# Patient Record
Sex: FEMALE | URBAN - METROPOLITAN AREA
[De-identification: names, ages, dates, MRNs, and addresses within clinical notes are randomized per-mention and may not be internally consistent; named-entity substitution may affect disease eponyms.]

---

## 2021-05-03 ENCOUNTER — APPOINTMENT (RX ONLY)
Dept: URBAN - METROPOLITAN AREA CLINIC 151 | Facility: CLINIC | Age: 71
Setting detail: DERMATOLOGY
End: 2021-05-03

## 2021-05-03 DIAGNOSIS — B00.1 HERPESVIRAL VESICULAR DERMATITIS: ICD-10-CM | Status: INADEQUATELY CONTROLLED

## 2021-05-03 DIAGNOSIS — L57.0 ACTINIC KERATOSIS: ICD-10-CM

## 2021-05-03 DIAGNOSIS — D485 NEOPLASM OF UNCERTAIN BEHAVIOR OF SKIN: ICD-10-CM

## 2021-05-03 PROBLEM — D48.5 NEOPLASM OF UNCERTAIN BEHAVIOR OF SKIN: Status: ACTIVE | Noted: 2021-05-03

## 2021-05-03 PROBLEM — D23.9 OTHER BENIGN NEOPLASM OF SKIN, UNSPECIFIED: Status: ACTIVE | Noted: 2021-05-03

## 2021-05-03 PROCEDURE — 17003 DESTRUCT PREMALG LES 2-14: CPT

## 2021-05-03 PROCEDURE — ? COUNSELING

## 2021-05-03 PROCEDURE — ? BIOPSY BY SHAVE METHOD

## 2021-05-03 PROCEDURE — 11102 TANGNTL BX SKIN SINGLE LES: CPT

## 2021-05-03 PROCEDURE — ? DIAGNOSIS COMMENT

## 2021-05-03 PROCEDURE — ? PRESCRIPTION MEDICATION MANAGEMENT

## 2021-05-03 PROCEDURE — 17000 DESTRUCT PREMALG LESION: CPT | Mod: 59

## 2021-05-03 PROCEDURE — ? LIQUID NITROGEN

## 2021-05-03 PROCEDURE — 99204 OFFICE O/P NEW MOD 45 MIN: CPT | Mod: 25

## 2021-05-03 ASSESSMENT — LOCATION ZONE DERM
LOCATION ZONE: TRUNK
LOCATION ZONE: FACE

## 2021-05-03 ASSESSMENT — LOCATION SIMPLE DESCRIPTION DERM
LOCATION SIMPLE: LEFT LOWER BACK
LOCATION SIMPLE: RIGHT FOREHEAD
LOCATION SIMPLE: LEFT BUTTOCK
LOCATION SIMPLE: RIGHT BUTTOCK
LOCATION SIMPLE: LEFT CHEEK

## 2021-05-03 ASSESSMENT — LOCATION DETAILED DESCRIPTION DERM
LOCATION DETAILED: LEFT BUTTOCK
LOCATION DETAILED: LEFT LATERAL MALAR CHEEK
LOCATION DETAILED: RIGHT LATERAL FOREHEAD
LOCATION DETAILED: LEFT LATERAL BUCCAL CHEEK
LOCATION DETAILED: LEFT INFERIOR MEDIAL LOWER BACK
LOCATION DETAILED: RIGHT BUTTOCK

## 2021-05-03 NOTE — PROCEDURE: DIAGNOSIS COMMENT
Render Risk Assessment In Note?: no
Comment: FBSE— discussed pt to cb if she needs a rf of valtrex
Detail Level: Simple
Comment: Clear today. Pt will cb for valtrex rf.
Comment: Noticed by Dr. Merrill

## 2021-05-03 NOTE — PROCEDURE: BIOPSY BY SHAVE METHOD
Detail Level: Detailed
Depth Of Biopsy: dermis
Was A Bandage Applied: Yes
Size Of Lesion In Cm: 0
Biopsy Type: H and E
Biopsy Method: double edge Personna blade
Anesthesia Type: 1% lidocaine with epinephrine and a 1:10 solution of 8.4% sodium bicarbonate
Hemostasis: Electrocautery
Wound Care: Aquaphor
Dressing: pressure dressing
Destruction After The Procedure: No
Type Of Destruction Used: Curettage
Curettage Text: The wound bed was treated with curettage after the biopsy was performed.
Cryotherapy Text: The wound bed was treated with cryotherapy after the biopsy was performed.
Electrodesiccation Text: The wound bed was treated with electrodesiccation after the biopsy was performed.
Electrodesiccation And Curettage Text: The wound bed was treated with electrodesiccation and curettage after the biopsy was performed.
Silver Nitrate Text: The wound bed was treated with silver nitrate after the biopsy was performed.
Lab: [id number]
Lab Facility: [id number]
Consent: Written consent was obtained and risks were reviewed including but not limited to scarring, infection, bleeding, scabbing, incomplete removal, nerve damage and allergy to anesthesia.
Post-Care Instructions: The medical assistant reviewed detailed post-care instructions with the patient: leave the original dressing in place for 24 hours, clean the wound once daily allowing water and gentle soap to wash over the site, cover with Aquaphor/Vaseline, and then apply a new bandage daily or if preferred apply a thick layer of Aquaphor/Vaseline twice daily until healed. Advised not to apply Neosporin or Polysporin.\\nInstructional handout provided.
Notification Instructions: Patient will be notified of biopsy results in 1-2 weeks. Advised patient to contact the office if results have not been received in 2 weeks.
Billing Type: Third-Party Bill
Information: Selecting Yes will display possible errors in your note based on the variables you have selected. This validation is only offered as a suggestion for you. PLEASE NOTE THAT THE VALIDATION TEXT WILL BE REMOVED WHEN YOU FINALIZE YOUR NOTE. IF YOU WANT TO FAX A PRELIMINARY NOTE YOU WILL NEED TO TOGGLE THIS TO 'NO' IF YOU DO NOT WANT IT IN YOUR FAXED NOTE.

## 2021-05-03 NOTE — PROCEDURE: LIQUID NITROGEN
Consent: The patient's verbal consent was obtained including but not limited to risks of crusting, scabbing, blistering, scarring, darker or lighter pigmentary change, recurrence, incomplete removal and infection.
Render Note In Bullet Format When Appropriate: No
Detail Level: Detailed
Render Post-Care Instructions In Note?: yes
Number Of Freeze-Thaw Cycles: 1 freeze-thaw cycle
Post-Care Instructions: I reviewed post-care instructions with the patient in detail: pt should expect treated lesion(s) to form a scab for ~1 week, pt may apply Vaseline or Aquaphor to crusted or scabbing areas if desired. Advised to monitor and return to clinic for re-treatment or biopsy if lesion(s) remain unresolved in ~2 months.
Duration Of Freeze Thaw-Cycle (Seconds): 0

## 2021-05-05 ENCOUNTER — HOSPITAL ENCOUNTER (OUTPATIENT)
Dept: HOSPITAL 47 - RADMRIMAIN | Age: 71
Discharge: HOME | End: 2021-05-05
Attending: ORTHOPAEDIC SURGERY
Payer: COMMERCIAL

## 2021-05-05 DIAGNOSIS — M75.112: Primary | ICD-10-CM

## 2021-05-05 DIAGNOSIS — M75.52: ICD-10-CM

## 2021-05-28 ENCOUNTER — HOSPITAL ENCOUNTER (OUTPATIENT)
Dept: HOSPITAL 47 - OR | Age: 71
Discharge: HOME | End: 2021-05-28
Attending: ORTHOPAEDIC SURGERY
Payer: COMMERCIAL

## 2021-05-28 VITALS — RESPIRATION RATE: 17 BRPM | SYSTOLIC BLOOD PRESSURE: 125 MMHG | DIASTOLIC BLOOD PRESSURE: 76 MMHG | HEART RATE: 56 BPM

## 2021-05-28 VITALS — TEMPERATURE: 97 F

## 2021-05-28 VITALS — BODY MASS INDEX: 34.1 KG/M2

## 2021-05-28 DIAGNOSIS — Z88.8: ICD-10-CM

## 2021-05-28 DIAGNOSIS — Z90.710: ICD-10-CM

## 2021-05-28 DIAGNOSIS — M75.102: Primary | ICD-10-CM

## 2021-05-28 DIAGNOSIS — Z79.890: ICD-10-CM

## 2021-05-28 DIAGNOSIS — K21.9: ICD-10-CM

## 2021-05-28 DIAGNOSIS — Z90.49: ICD-10-CM

## 2021-05-28 DIAGNOSIS — Z79.891: ICD-10-CM

## 2021-05-28 DIAGNOSIS — Z80.9: ICD-10-CM

## 2021-05-28 DIAGNOSIS — Z82.49: ICD-10-CM

## 2021-05-28 DIAGNOSIS — M25.812: ICD-10-CM

## 2021-05-28 DIAGNOSIS — Z98.890: ICD-10-CM

## 2021-05-28 DIAGNOSIS — Z83.3: ICD-10-CM

## 2021-05-28 DIAGNOSIS — Z79.899: ICD-10-CM

## 2021-05-28 DIAGNOSIS — E03.9: ICD-10-CM

## 2021-05-28 DIAGNOSIS — M19.012: ICD-10-CM

## 2021-05-28 PROCEDURE — 29827 SHO ARTHRS SRG RT8TR CUF RPR: CPT

## 2021-05-28 PROCEDURE — 29826 SHO ARTHRS SRG DECOMPRESSION: CPT

## 2021-05-28 PROCEDURE — 86901 BLOOD TYPING SEROLOGIC RH(D): CPT

## 2021-05-28 PROCEDURE — 86850 RBC ANTIBODY SCREEN: CPT

## 2021-05-28 PROCEDURE — 29824 SHO ARTHRS SRG DSTL CLAVICLC: CPT

## 2021-05-28 PROCEDURE — 76942 ECHO GUIDE FOR BIOPSY: CPT

## 2021-05-28 PROCEDURE — 84132 ASSAY OF SERUM POTASSIUM: CPT

## 2021-05-28 PROCEDURE — 86900 BLOOD TYPING SEROLOGIC ABO: CPT

## 2021-05-28 PROCEDURE — 64415 NJX AA&/STRD BRCH PLXS IMG: CPT

## 2022-07-10 ENCOUNTER — HOSPITAL ENCOUNTER (EMERGENCY)
Dept: HOSPITAL 47 - EC | Age: 72
Discharge: HOME | End: 2022-07-10
Payer: COMMERCIAL

## 2022-07-10 VITALS
TEMPERATURE: 97.3 F | SYSTOLIC BLOOD PRESSURE: 167 MMHG | RESPIRATION RATE: 18 BRPM | HEART RATE: 82 BPM | DIASTOLIC BLOOD PRESSURE: 92 MMHG

## 2022-07-10 DIAGNOSIS — H10.9: ICD-10-CM

## 2022-07-10 DIAGNOSIS — Z79.899: ICD-10-CM

## 2022-07-10 DIAGNOSIS — J06.9: Primary | ICD-10-CM

## 2022-07-10 DIAGNOSIS — Z79.890: ICD-10-CM

## 2022-07-10 DIAGNOSIS — K21.9: ICD-10-CM

## 2022-07-10 DIAGNOSIS — Z20.822: ICD-10-CM

## 2022-07-10 DIAGNOSIS — E07.9: ICD-10-CM

## 2022-07-10 PROCEDURE — 99283 EMERGENCY DEPT VISIT LOW MDM: CPT

## 2022-07-10 PROCEDURE — 87635 SARS-COV-2 COVID-19 AMP PRB: CPT

## 2022-07-10 PROCEDURE — 71046 X-RAY EXAM CHEST 2 VIEWS: CPT

## 2023-02-02 ENCOUNTER — OFFICE VISIT (OUTPATIENT)
Dept: FAMILY MEDICINE CLINIC | Facility: CLINIC | Age: 73
End: 2023-02-02
Payer: MEDICARE

## 2023-02-02 VITALS
DIASTOLIC BLOOD PRESSURE: 80 MMHG | SYSTOLIC BLOOD PRESSURE: 124 MMHG | RESPIRATION RATE: 16 BRPM | HEIGHT: 65 IN | HEART RATE: 88 BPM | WEIGHT: 170 LBS | TEMPERATURE: 97.4 F | BODY MASS INDEX: 28.32 KG/M2 | OXYGEN SATURATION: 100 %

## 2023-02-02 DIAGNOSIS — Z12.11 ENCOUNTER FOR SCREENING COLONOSCOPY: ICD-10-CM

## 2023-02-02 DIAGNOSIS — E78.2 MIXED HYPERLIPIDEMIA: ICD-10-CM

## 2023-02-02 DIAGNOSIS — E11.65 TYPE 2 DIABETES MELLITUS WITH HYPERGLYCEMIA, WITHOUT LONG-TERM CURRENT USE OF INSULIN: ICD-10-CM

## 2023-02-02 DIAGNOSIS — Z76.89 ENCOUNTER TO ESTABLISH CARE: Primary | ICD-10-CM

## 2023-02-02 DIAGNOSIS — R21 FACIAL RASH: ICD-10-CM

## 2023-02-02 PROBLEM — E11.9 DIABETES MELLITUS: Status: ACTIVE | Noted: 2023-02-02

## 2023-02-02 PROCEDURE — 99204 OFFICE O/P NEW MOD 45 MIN: CPT | Performed by: NURSE PRACTITIONER

## 2023-02-02 RX ORDER — ASPIRIN 325 MG
325 TABLET ORAL
COMMUNITY

## 2023-02-02 RX ORDER — DIPHENOXYLATE HYDROCHLORIDE AND ATROPINE SULFATE 2.5; .025 MG/1; MG/1
TABLET ORAL DAILY
COMMUNITY

## 2023-02-02 RX ORDER — ASPIRIN/SOD BICARB/CITRIC ACID 324 MG
TABLET, EFFERVESCENT ORAL
COMMUNITY

## 2023-02-02 NOTE — PROGRESS NOTES
Patient ID: Mercedes Santos is a 72 y.o. female     Patient Care Team:  Head, ESTHER Oliveira as PCP - General (Nurse Practitioner)    Subjective     Chief Complaint   Patient presents with   • Establish Care   • Diabetes   • Mass     Forehead         History of Present Illness    Mercedes Satnos presents to [facility] today to establish care with our practice.  She moved here recently from [facility].  History of diabetes which is currently managed with metformin 500 mg daily.  Last hemoglobin A1c was checked 5 months ago which was 6.1.  Urine microalbumin was also completed at that time which was negative.  Cholesterol levels were elevated with total cholesterol of 243, LDL of 162, and triglycerides of 162.  Kidney and liver function test are stable.    History of breast cancer in 2016 of right breast. ER+.  She was followed closely by MD Brito.  Last mammogram completed September 23, 2022 and was negative.    Lump to right side of forehead.  First noticed about 6 months ago.  She remembers hitting her forehead however unsure how long ago.  No tenderness to area.  She also has an area to left temple which dermatology was managing in [place].  States she has lesion removed in past however continues to return with scaly rash area.  Needs dermatology referral.      Colonoscopy completed 2013.    Last Dexa scan completed 2020.      She denies any complaints of fever, chills, cough, chest pain, shortness of air, abdominal pain, nausea, or any other concerns.     The following portions of the patient's history were reviewed and updated as appropriate: allergies, current medications, past family history, past medical history, past social history, past surgical history and problem list.        ROS    Vitals:    02/02/23 1304   BP: 124/80   Pulse: 88   Resp: 16   Temp: 97.4 °F (36.3 °C)   SpO2: 100%       Documented weights    02/02/23 1304   Weight: 77.1 kg (170 lb)     Body mass index is  28.29 kg/m².    No results found for this or any previous visit.        Objective     Physical Exam  Vitals reviewed.   Constitutional:       General: She is not in acute distress.     Appearance: She is well-developed.   HENT:      Head: Normocephalic and atraumatic.      Right Ear: Tympanic membrane normal.      Left Ear: Tympanic membrane normal.   Eyes:      Pupils: Pupils are equal, round, and reactive to light.   Cardiovascular:      Rate and Rhythm: Normal rate and regular rhythm.      Heart sounds: Normal heart sounds. No murmur heard.  Pulmonary:      Effort: Pulmonary effort is normal.      Breath sounds: Normal breath sounds. No wheezing, rhonchi or rales.   Abdominal:      General: Bowel sounds are normal.      Palpations: Abdomen is soft.      Tenderness: There is no abdominal tenderness.   Musculoskeletal:         General: No tenderness. Normal range of motion.      Cervical back: Normal range of motion and neck supple.   Skin:     General: Skin is warm and dry.      Findings: Rash (pink scaly rash to left temple.  ) present. No erythema.      Comments: Bony prominence to right side of forehead about 2 cm.  No redness, bruising, or tenderness.     Neurological:      Mental Status: She is alert and oriented to person, place, and time.   Psychiatric:         Behavior: Behavior normal.            Assessment & Plan     Assessment/Plan     Diagnoses and all orders for this visit:    1. Encounter to establish care (Primary)    2. Type 2 diabetes mellitus with hyperglycemia, without long-term current use of insulin (HCC)  -     Ambulatory Referral to Nutrition Services  -     metFORMIN (GLUCOPHAGE) 500 MG tablet; Take 1 tablet by mouth Daily With Breakfast.  Dispense: 90 tablet; Refill: 3  -     CBC (No Diff); Future  -     Comprehensive Metabolic Panel; Future  -     Hemoglobin A1c; Future  -     Lipid Panel With / Chol / HDL Ratio; Future  -     TSH Rfx On Abnormal To Free T4; Future  -     TSH Rfx On  Abnormal To Free T4  -     Lipid Panel With / Chol / HDL Ratio  -     Hemoglobin A1c  -     Comprehensive Metabolic Panel  -     CBC (No Diff)  -     Ambulatory Referral to Ophthalmology    3. Mixed hyperlipidemia  -     Ambulatory Referral to Nutrition Services  -     CBC (No Diff); Future  -     Comprehensive Metabolic Panel; Future  -     Hemoglobin A1c; Future  -     Lipid Panel With / Chol / HDL Ratio; Future  -     TSH Rfx On Abnormal To Free T4; Future  -     TSH Rfx On Abnormal To Free T4  -     Lipid Panel With / Chol / HDL Ratio  -     Hemoglobin A1c  -     Comprehensive Metabolic Panel  -     CBC (No Diff)    4. Encounter for screening colonoscopy  -     Ambulatory Referral For Screening Colonoscopy    5. Facial rash  -     Ambulatory Referral to Dermatology      Summary:  Mercedes Santos presented to office today to establish care with our practice.  We will have her schedule fasting lab appointment in the near future.  We will notify her of results.  Blood pressure current stable 124/80.  No changes in medications at this time if all labs are stable, instructed return to office in 6 months for next recheck appointment with fasting labs and AWV.  Appropriate referrals provided.  She does have history of breast cancer and was closely followed by MD Brito.  Last mammogram completed in September.  We will discuss further management of this during next office visit.  Likely will need referral to oncology for continued surveillance    In the meantime, instructed to contact us sooner for any problems or concerns.    Follow Up:  Return in about 6 months (around 8/2/2023) for Recheck on DM, Medicare Wellness with fasting labs the week before.  .    Patient was given instructions and counseling regarding condition or for health maintenance advice.  Please see specific information pulled into the AVS if appropriate.      Patient was wearing facemask when I entered the room and throughout our encounter.  Protective equipment was worn throughout this patient encounter including a face mask.  Hand hygiene was performed before donning protective equipment and after removal when leaving the room.     Emili Iraheta, APRN  Family Medicine  [facility]  02/03/23  16:58 EST

## 2023-02-03 ENCOUNTER — PATIENT ROUNDING (BHMG ONLY) (OUTPATIENT)
Dept: FAMILY MEDICINE CLINIC | Facility: CLINIC | Age: 73
End: 2023-02-03
Payer: MEDICARE

## 2023-02-03 PROBLEM — E78.2 MIXED HYPERLIPIDEMIA: Status: ACTIVE | Noted: 2023-02-03

## 2023-02-03 PROBLEM — Z85.3 HISTORY OF BREAST CANCER: Status: ACTIVE | Noted: 2023-02-03

## 2023-02-07 DIAGNOSIS — E78.2 MIXED HYPERLIPIDEMIA: Primary | ICD-10-CM

## 2023-02-07 DIAGNOSIS — E11.65 TYPE 2 DIABETES MELLITUS WITH HYPERGLYCEMIA, WITHOUT LONG-TERM CURRENT USE OF INSULIN: ICD-10-CM

## 2023-02-07 DIAGNOSIS — R79.89 ELEVATED TSH: ICD-10-CM

## 2023-02-07 LAB
ALBUMIN SERPL-MCNC: 4.6 G/DL (ref 3.5–5.2)
ALBUMIN/GLOB SERPL: 2.2 G/DL
ALP SERPL-CCNC: 99 U/L (ref 39–117)
ALT SERPL-CCNC: 16 U/L (ref 1–33)
AST SERPL-CCNC: 16 U/L (ref 1–32)
BILIRUB SERPL-MCNC: 0.5 MG/DL (ref 0–1.2)
BUN SERPL-MCNC: 17 MG/DL (ref 8–23)
BUN/CREAT SERPL: 19.8 (ref 7–25)
CALCIUM SERPL-MCNC: 9.6 MG/DL (ref 8.6–10.5)
CHLORIDE SERPL-SCNC: 104 MMOL/L (ref 98–107)
CHOLEST SERPL-MCNC: 273 MG/DL (ref 0–200)
CHOLEST/HDLC SERPL: 4.63 {RATIO}
CO2 SERPL-SCNC: 26.7 MMOL/L (ref 22–29)
CREAT SERPL-MCNC: 0.86 MG/DL (ref 0.57–1)
EGFRCR SERPLBLD CKD-EPI 2021: 71.9 ML/MIN/1.73
ERYTHROCYTE [DISTWIDTH] IN BLOOD BY AUTOMATED COUNT: 13.3 % (ref 12.3–15.4)
GLOBULIN SER CALC-MCNC: 2.1 GM/DL
GLUCOSE SERPL-MCNC: 143 MG/DL (ref 65–99)
HBA1C MFR BLD: 6.5 % (ref 4.8–5.6)
HCT VFR BLD AUTO: 43 % (ref 34–46.6)
HDLC SERPL-MCNC: 59 MG/DL (ref 40–60)
HGB BLD-MCNC: 14.4 G/DL (ref 12–15.9)
LDLC SERPL CALC-MCNC: 191 MG/DL (ref 0–100)
MCH RBC QN AUTO: 31.1 PG (ref 26.6–33)
MCHC RBC AUTO-ENTMCNC: 33.5 G/DL (ref 31.5–35.7)
MCV RBC AUTO: 92.9 FL (ref 79–97)
PLATELET # BLD AUTO: 233 10*3/MM3 (ref 140–450)
POTASSIUM SERPL-SCNC: 4.3 MMOL/L (ref 3.5–5.2)
PROT SERPL-MCNC: 6.7 G/DL (ref 6–8.5)
RBC # BLD AUTO: 4.63 10*6/MM3 (ref 3.77–5.28)
SODIUM SERPL-SCNC: 141 MMOL/L (ref 136–145)
T4 FREE SERPL-MCNC: 1.08 NG/DL
TRIGL SERPL-MCNC: 128 MG/DL (ref 0–150)
TSH SERPL DL<=0.005 MIU/L-ACNC: 4.98 UIU/ML (ref 0.27–4.2)
VLDLC SERPL CALC-MCNC: 23 MG/DL (ref 5–40)
WBC # BLD AUTO: 4.84 10*3/MM3 (ref 3.4–10.8)

## 2023-02-22 ENCOUNTER — HOSPITAL ENCOUNTER (OUTPATIENT)
Dept: DIABETES SERVICES | Facility: HOSPITAL | Age: 73
Discharge: HOME | End: 2023-02-22
Admitting: NURSE PRACTITIONER
Payer: MEDICARE

## 2023-02-22 PROCEDURE — 97802 MEDICAL NUTRITION INDIV IN: CPT

## 2023-02-22 NOTE — CONSULTS
Mrs. Santos was seen today by Registered Dietitian for Diabetes diet education. Consistent with the ADA’s standards of care, a comprehensive assessment/training record has been sent to medical records (see media tab).    Family history of diabetes and heart disease. Moved here November 2022. Hx of diabetes education while in [place]. Blood sugar typically 115 or less in morning. Was educated on 2 carb choice breakfast, 3 carb choice lunch, 2 carb choice breakfast previously. Discussed more flexible range of 2-3 at each meal. Reviewed labels, counting carbs, meal planning. Likes to walk, does stretches. Hasn't been as active since its winter, encouraged chair exercises for cold weather/rainy day plan.     Mrs. Santos has been encouraged to call our office with questions or additional education needs. Please place referral for additional services or follow-up should need arise.    Thank you for the referral.

## 2023-04-08 ENCOUNTER — HOSPITAL ENCOUNTER (OUTPATIENT)
Dept: HOSPITAL 47 - EC | Age: 73
Setting detail: OBSERVATION
LOS: 2 days | Discharge: HOME | End: 2023-04-10
Attending: INTERNAL MEDICINE | Admitting: INTERNAL MEDICINE
Payer: MEDICARE

## 2023-04-08 DIAGNOSIS — Z79.890: ICD-10-CM

## 2023-04-08 DIAGNOSIS — Z98.84: ICD-10-CM

## 2023-04-08 DIAGNOSIS — E87.20: ICD-10-CM

## 2023-04-08 DIAGNOSIS — I44.0: ICD-10-CM

## 2023-04-08 DIAGNOSIS — E87.6: ICD-10-CM

## 2023-04-08 DIAGNOSIS — K21.9: ICD-10-CM

## 2023-04-08 DIAGNOSIS — K11.8: ICD-10-CM

## 2023-04-08 DIAGNOSIS — Z90.710: ICD-10-CM

## 2023-04-08 DIAGNOSIS — R42: Primary | ICD-10-CM

## 2023-04-08 DIAGNOSIS — E03.9: ICD-10-CM

## 2023-04-08 DIAGNOSIS — Z88.8: ICD-10-CM

## 2023-04-08 DIAGNOSIS — I49.9: ICD-10-CM

## 2023-04-08 DIAGNOSIS — Z82.49: ICD-10-CM

## 2023-04-08 DIAGNOSIS — Z79.899: ICD-10-CM

## 2023-04-08 DIAGNOSIS — Z98.890: ICD-10-CM

## 2023-04-08 DIAGNOSIS — E66.9: ICD-10-CM

## 2023-04-08 LAB
ALBUMIN SERPL-MCNC: 2.4 G/DL (ref 3.5–5)
ALP SERPL-CCNC: 35 U/L (ref 38–126)
ALT SERPL-CCNC: 18 U/L (ref 4–34)
ANION GAP SERPL CALC-SCNC: 3 MMOL/L
AST SERPL-CCNC: 22 U/L (ref 14–36)
BASOPHILS # BLD AUTO: 0 K/UL (ref 0–0.2)
BASOPHILS NFR BLD AUTO: 1 %
BUN SERPL-SCNC: 14 MG/DL (ref 7–17)
CALCIUM SPEC-MCNC: 6.6 MG/DL (ref 8.4–10.2)
CHLORIDE SERPL-SCNC: 117 MMOL/L (ref 98–107)
CO2 SERPL-SCNC: 21 MMOL/L (ref 22–30)
EOSINOPHIL # BLD AUTO: 0.2 K/UL (ref 0–0.7)
EOSINOPHIL NFR BLD AUTO: 3 %
ERYTHROCYTE [DISTWIDTH] IN BLOOD BY AUTOMATED COUNT: 4.45 M/UL (ref 3.8–5.4)
ERYTHROCYTE [DISTWIDTH] IN BLOOD: 12.4 % (ref 11.5–15.5)
GLUCOSE SERPL-MCNC: 74 MG/DL (ref 74–99)
HCT VFR BLD AUTO: 41.6 % (ref 34–46)
HGB BLD-MCNC: 13.9 GM/DL (ref 11.4–16)
INR PPP: 0.9 (ref ?–1.2)
LYMPHOCYTES # SPEC AUTO: 2.3 K/UL (ref 1–4.8)
LYMPHOCYTES NFR SPEC AUTO: 38 %
MCH RBC QN AUTO: 31.1 PG (ref 25–35)
MCHC RBC AUTO-ENTMCNC: 33.3 G/DL (ref 31–37)
MCV RBC AUTO: 93.5 FL (ref 80–100)
MONOCYTES # BLD AUTO: 0.3 K/UL (ref 0–1)
MONOCYTES NFR BLD AUTO: 6 %
NEUTROPHILS # BLD AUTO: 3 K/UL (ref 1.3–7.7)
NEUTROPHILS NFR BLD AUTO: 50 %
PLATELET # BLD AUTO: 179 K/UL (ref 150–450)
POTASSIUM SERPL-SCNC: 3.3 MMOL/L (ref 3.5–5.1)
PROT SERPL-MCNC: 4.6 G/DL (ref 6.3–8.2)
PT BLD: 10.1 SEC (ref 9–12)
SODIUM SERPL-SCNC: 141 MMOL/L (ref 137–145)
WBC # BLD AUTO: 6 K/UL (ref 3.8–10.6)

## 2023-04-08 PROCEDURE — 85610 PROTHROMBIN TIME: CPT

## 2023-04-08 PROCEDURE — 99285 EMERGENCY DEPT VISIT HI MDM: CPT

## 2023-04-08 PROCEDURE — 96360 HYDRATION IV INFUSION INIT: CPT

## 2023-04-08 PROCEDURE — 80053 COMPREHEN METABOLIC PANEL: CPT

## 2023-04-08 PROCEDURE — 96361 HYDRATE IV INFUSION ADD-ON: CPT

## 2023-04-08 PROCEDURE — 70551 MRI BRAIN STEM W/O DYE: CPT

## 2023-04-08 PROCEDURE — 82330 ASSAY OF CALCIUM: CPT

## 2023-04-08 PROCEDURE — 85025 COMPLETE CBC W/AUTO DIFF WBC: CPT

## 2023-04-08 PROCEDURE — 70549 MR ANGIOGRAPH NECK W/O&W/DYE: CPT

## 2023-04-08 PROCEDURE — 76536 US EXAM OF HEAD AND NECK: CPT

## 2023-04-08 PROCEDURE — 80048 BASIC METABOLIC PNL TOTAL CA: CPT

## 2023-04-08 PROCEDURE — 84443 ASSAY THYROID STIM HORMONE: CPT

## 2023-04-08 PROCEDURE — 70496 CT ANGIOGRAPHY HEAD: CPT

## 2023-04-08 PROCEDURE — 80061 LIPID PANEL: CPT

## 2023-04-08 PROCEDURE — 93306 TTE W/DOPPLER COMPLETE: CPT

## 2023-04-08 PROCEDURE — 96372 THER/PROPH/DIAG INJ SC/IM: CPT

## 2023-04-08 PROCEDURE — 70498 CT ANGIOGRAPHY NECK: CPT

## 2023-04-08 PROCEDURE — 36415 COLL VENOUS BLD VENIPUNCTURE: CPT

## 2023-04-08 PROCEDURE — 70544 MR ANGIOGRAPHY HEAD W/O DYE: CPT

## 2023-04-08 PROCEDURE — 93005 ELECTROCARDIOGRAM TRACING: CPT

## 2023-04-08 PROCEDURE — 84484 ASSAY OF TROPONIN QUANT: CPT

## 2023-04-08 PROCEDURE — 83036 HEMOGLOBIN GLYCOSYLATED A1C: CPT

## 2023-04-08 PROCEDURE — 70450 CT HEAD/BRAIN W/O DYE: CPT

## 2023-04-08 RX ADMIN — CEFAZOLIN SCH MLS/HR: 330 INJECTION, POWDER, FOR SOLUTION INTRAMUSCULAR; INTRAVENOUS at 23:15

## 2023-04-09 LAB
ANION GAP SERPL CALC-SCNC: 5 MMOL/L
BASOPHILS # BLD AUTO: 0 K/UL (ref 0–0.2)
BASOPHILS NFR BLD AUTO: 0 %
BUN SERPL-SCNC: 13 MG/DL (ref 7–17)
CALCIUM SPEC-MCNC: 9 MG/DL (ref 8.4–10.2)
CHLORIDE SERPL-SCNC: 112 MMOL/L (ref 98–107)
CHOLEST SERPL-MCNC: 154 MG/DL (ref 0–200)
CO2 SERPL-SCNC: 23 MMOL/L (ref 22–30)
EOSINOPHIL # BLD AUTO: 0.2 K/UL (ref 0–0.7)
EOSINOPHIL NFR BLD AUTO: 5 %
ERYTHROCYTE [DISTWIDTH] IN BLOOD BY AUTOMATED COUNT: 4.01 M/UL (ref 3.8–5.4)
ERYTHROCYTE [DISTWIDTH] IN BLOOD: 12.3 % (ref 11.5–15.5)
GLUCOSE SERPL-MCNC: 110 MG/DL (ref 74–99)
HCT VFR BLD AUTO: 37.4 % (ref 34–46)
HDLC SERPL-MCNC: 36.6 MG/DL (ref 40–60)
HGB BLD-MCNC: 12.6 GM/DL (ref 11.4–16)
LDLC SERPL CALC-MCNC: 98.5 MG/DL (ref 0–131)
LYMPHOCYTES # SPEC AUTO: 1.7 K/UL (ref 1–4.8)
LYMPHOCYTES NFR SPEC AUTO: 37 %
MCH RBC QN AUTO: 31.5 PG (ref 25–35)
MCHC RBC AUTO-ENTMCNC: 33.7 G/DL (ref 31–37)
MCV RBC AUTO: 93.4 FL (ref 80–100)
MONOCYTES # BLD AUTO: 0.3 K/UL (ref 0–1)
MONOCYTES NFR BLD AUTO: 6 %
NEUTROPHILS # BLD AUTO: 2.3 K/UL (ref 1.3–7.7)
NEUTROPHILS NFR BLD AUTO: 50 %
PLATELET # BLD AUTO: 165 K/UL (ref 150–450)
POTASSIUM SERPL-SCNC: 3.8 MMOL/L (ref 3.5–5.1)
SODIUM SERPL-SCNC: 140 MMOL/L (ref 137–145)
TRIGL SERPL-MCNC: 94.6 MG/DL (ref 0–149)
VLDLC SERPL CALC-MCNC: 18.92 MG/DL (ref 5–40)
WBC # BLD AUTO: 4.6 K/UL (ref 3.8–10.6)

## 2023-04-09 RX ADMIN — CEFAZOLIN SCH: 330 INJECTION, POWDER, FOR SOLUTION INTRAMUSCULAR; INTRAVENOUS at 11:38

## 2023-04-09 RX ADMIN — HEPARIN SODIUM SCH: 5000 INJECTION INTRAVENOUS; SUBCUTANEOUS at 17:38

## 2023-04-09 RX ADMIN — HEPARIN SODIUM SCH UNIT: 5000 INJECTION INTRAVENOUS; SUBCUTANEOUS at 08:00

## 2023-04-09 RX ADMIN — HEPARIN SODIUM SCH: 5000 INJECTION INTRAVENOUS; SUBCUTANEOUS at 23:06

## 2023-04-09 RX ADMIN — CEFAZOLIN SCH: 330 INJECTION, POWDER, FOR SOLUTION INTRAMUSCULAR; INTRAVENOUS at 13:55

## 2023-04-10 VITALS — RESPIRATION RATE: 16 BRPM | TEMPERATURE: 97.4 F

## 2023-04-10 VITALS — HEART RATE: 67 BPM | SYSTOLIC BLOOD PRESSURE: 149 MMHG | DIASTOLIC BLOOD PRESSURE: 84 MMHG

## 2023-04-10 RX ADMIN — HEPARIN SODIUM SCH UNIT: 5000 INJECTION INTRAVENOUS; SUBCUTANEOUS at 08:41

## 2023-04-10 RX ADMIN — HEPARIN SODIUM SCH: 5000 INJECTION INTRAVENOUS; SUBCUTANEOUS at 17:03

## 2023-07-24 ENCOUNTER — OFFICE VISIT (OUTPATIENT)
Dept: FAMILY MEDICINE CLINIC | Facility: CLINIC | Age: 73
End: 2023-07-24
Payer: MEDICARE

## 2023-07-24 VITALS
HEIGHT: 65 IN | DIASTOLIC BLOOD PRESSURE: 78 MMHG | TEMPERATURE: 97.1 F | WEIGHT: 175 LBS | BODY MASS INDEX: 29.16 KG/M2 | SYSTOLIC BLOOD PRESSURE: 118 MMHG | OXYGEN SATURATION: 99 % | HEART RATE: 83 BPM

## 2023-07-24 DIAGNOSIS — K59.00 CONSTIPATION, UNSPECIFIED CONSTIPATION TYPE: ICD-10-CM

## 2023-07-24 DIAGNOSIS — Z12.11 COLON CANCER SCREENING: ICD-10-CM

## 2023-07-24 DIAGNOSIS — Z85.3 HISTORY OF BREAST CANCER: ICD-10-CM

## 2023-07-24 DIAGNOSIS — N64.4 BREAST PAIN, LEFT: ICD-10-CM

## 2023-07-24 DIAGNOSIS — M79.629 PAIN IN AXILLA, UNSPECIFIED LATERALITY: Primary | ICD-10-CM

## 2023-07-24 PROCEDURE — 3044F HG A1C LEVEL LT 7.0%: CPT | Performed by: NURSE PRACTITIONER

## 2023-07-24 PROCEDURE — 93000 ELECTROCARDIOGRAM COMPLETE: CPT | Performed by: NURSE PRACTITIONER

## 2023-07-24 PROCEDURE — 1159F MED LIST DOCD IN RCRD: CPT | Performed by: NURSE PRACTITIONER

## 2023-07-24 PROCEDURE — 99214 OFFICE O/P EST MOD 30 MIN: CPT | Performed by: NURSE PRACTITIONER

## 2023-07-24 PROCEDURE — 1160F RVW MEDS BY RX/DR IN RCRD: CPT | Performed by: NURSE PRACTITIONER

## 2023-07-24 NOTE — PROGRESS NOTES
Answers submitted by the patient for this visit:  Other (Submitted on 7/24/2023)  Please describe your symptoms.: Inflamatory pain under arm and back by spine and shoulder blade.  Acute constipation. Pain on right side.  Have you had these symptoms before?: Yes  How long have you been having these symptoms?: Greater than 2 weeks  Please list any medications you are currently taking for this condition.: Metformin  Please describe any probable cause for these symptoms. : Unknown  Primary Reason for Visit (Submitted on 7/24/2023)  What is the primary reason for your visit?: Other  Patient ID: Mercedes Santos is a 73 y.o. female     Patient Care Team:  Head, ESTHER Oliveira as PCP - General (Nurse Practitioner)    Subjective     Chief Complaint   Patient presents with    Pain     Under left arm, at times will be sharp shooting pain    Constipation       History of Present Illness    Mercedes Santos presents to [facility] today for complaints of left arm/axilla pain with radiation to left scapula.   Upper back pain and under left arm, lateral left breast and left axilla pain. Onset couple of months ago.  Pain will be dull at times and then sharp.  Fell Mother's day weekend and injured left forearm. Unsure if related.  Pain feels like related to inflammation. At one point, felt what she though was enlarged lymph nodes to left axilla which were tender.  She increased her fluid intake which she feels helped with this.  Home remedies:  None.   Hx of right breast cancer in 2016.      Complains of right side of abdomen pain.  Onset 1 year ago.   Intermittent.  Problems with constipation.  Last BM yesterday however had to use enema and had increased gas.  Complains of abdomen bloating.   No blood in stool.    Has tried probiotic and Miralax. Has not been taking Miralax due to worried about affecting kidney function.    Will have nausea at times.  No vomiting.  No weight loss.   Has been 10 years  since colonoscopy.     She denies any complaints of fever, chills, cough, chest pain, shortness of air, nausea, or any other concerns.     The following portions of the patient's history were reviewed and updated as appropriate: allergies, current medications, past family history, past medical history, past social history, past surgical history and problem list.       ROS    Vitals:    07/24/23 0813   BP: 118/78   Pulse: 83   Temp: 97.1 °F (36.2 °C)   SpO2: 99%       Documented weights    07/24/23 0813   Weight: 79.4 kg (175 lb)     Body mass index is 29.12 kg/m².    Results for orders placed or performed in visit on 02/02/23   TSH Rfx On Abnormal To Free T4    Specimen: Blood   Result Value Ref Range    TSH 4.98 (H) 0.27 - 4.20 uIU/mL   Lipid Panel With / Chol / HDL Ratio    Specimen: Blood   Result Value Ref Range    Total Cholesterol 273 (H) 0 - 200 mg/dL    Triglycerides 128 0 - 150 mg/dL    HDL Cholesterol 59 40 - 60 mg/dL    VLDL Cholesterol Federico 23 5 - 40 mg/dL    LDL Chol Calc (NIH) 191 (H) 0 - 100 mg/dL    Chol/HDL Ratio 4.63    Hemoglobin A1c    Specimen: Blood   Result Value Ref Range    Hemoglobin A1C 6.50 (H) 4.80 - 5.60 %   Comprehensive Metabolic Panel    Specimen: Blood   Result Value Ref Range    Glucose 143 (H) 65 - 99 mg/dL    BUN 17 8 - 23 mg/dL    Creatinine 0.86 0.57 - 1.00 mg/dL    EGFR Result 71.9 >60.0 mL/min/1.73    BUN/Creatinine Ratio 19.8 7.0 - 25.0    Sodium 141 136 - 145 mmol/L    Potassium 4.3 3.5 - 5.2 mmol/L    Chloride 104 98 - 107 mmol/L    Total CO2 26.7 22.0 - 29.0 mmol/L    Calcium 9.6 8.6 - 10.5 mg/dL    Total Protein 6.7 6.0 - 8.5 g/dL    Albumin 4.6 3.5 - 5.2 g/dL    Globulin 2.1 gm/dL    A/G Ratio 2.2 g/dL    Total Bilirubin 0.5 0.0 - 1.2 mg/dL    Alkaline Phosphatase 99 39 - 117 U/L    AST (SGOT) 16 1 - 32 U/L    ALT (SGPT) 16 1 - 33 U/L   CBC (No Diff)    Specimen: Blood   Result Value Ref Range    WBC 4.84 3.40 - 10.80 10*3/mm3    RBC 4.63 3.77 - 5.28 10*6/mm3     Hemoglobin 14.4 12.0 - 15.9 g/dL    Hematocrit 43.0 34.0 - 46.6 %    MCV 92.9 79.0 - 97.0 fL    MCH 31.1 26.6 - 33.0 pg    MCHC 33.5 31.5 - 35.7 g/dL    RDW 13.3 12.3 - 15.4 %    Platelets 233 140 - 450 10*3/mm3   T4, Free   Result Value Ref Range    Free T4 1.08 ng/dL           Objective     Physical Exam  Vitals reviewed.   Constitutional:       General: She is not in acute distress.  HENT:      Head: Normocephalic and atraumatic.   Cardiovascular:      Rate and Rhythm: Normal rate and regular rhythm.      Heart sounds: No murmur heard.  Pulmonary:      Effort: Pulmonary effort is normal.      Breath sounds: Normal breath sounds. No wheezing.   Chest:      Chest wall: No mass.   Breasts:     Right: Normal. No inverted nipple, mass or skin change.      Left: Normal. No inverted nipple, mass or skin change.      Comments: Tenderness to bilateral axilla area.  Right > left.  No lymphadenopathy.    Abdominal:      General: Bowel sounds are normal. There is no distension.      Palpations: Abdomen is soft. There is no hepatomegaly or splenomegaly.      Tenderness: generalized       Hernia: No hernia is present.   Musculoskeletal:      Right shoulder: Normal. No swelling, deformity or tenderness.      Left shoulder: Normal. No swelling, deformity or tenderness.      Cervical back: Normal.      Thoracic back: Normal.   Skin:     General: Skin is warm and dry.   Neurological:      Mental Status: She is alert.       ECG 12 Lead    Date/Time: 7/24/2023 10:08 AM  Performed by: Emili Iraheta APRN  Authorized by: Emili Iraheta APRN   Previous ECG: no previous ECG available  Rhythm: sinus rhythm  Rate: normal  BPM: 84  Conduction: conduction normal  ST Segments: ST segments normal  T Waves: T waves normal  QRS axis: normal  Other findings: low voltage  Clinical impression comment: borderline EKG           Assessment & Plan     Assessment/Plan     Diagnoses and all orders for this visit:    1. Pain in axilla, unspecified laterality  (Primary)  -     Mammo diagnostic digital tomosynthesis bilateral w CAD; Future  -     US breast bilateral limited; Future    2. Colon cancer screening  -     Ambulatory Referral For Screening Colonoscopy    3. History of breast cancer  -     Mammo diagnostic digital tomosynthesis bilateral w CAD; Future  -     US breast bilateral limited; Future    4. Breast pain, left  -     Mammo diagnostic digital tomosynthesis bilateral w CAD; Future  -     US breast bilateral limited; Future    5. Constipation, unspecified constipation type    Other orders  -     ECG 12 Lead          Summary:  Mercedes Santos has had problems with left arm, left axilla, and left lateral breast pain for the past couple of months.  Pain will also occur at times to left scapula and upper back.  No home remedies.  EKG completed in office today stable.  She has history of right breast cancer.  Due to current symptoms, recommend proceeding with diagnostic mammogram for further evaluation.  Results will determine further recommendations.  May also try heating pad to see if helps along with Tylenol or ibuprofen.  She is due for screening colonoscopy.  Referral was placed back in February however she states she was not contacted regarding this.  Placed new referral.  Concerning the upper abdominal pain and constipation, recommend taking MiraLAX daily to see if helps.  Increasing fluids and fiber.  We will reevaluate during next office visit.  Sooner for any problems.    In the meantime, instructed to contact us sooner for any problems or concerns.    Follow Up:  Return for Next scheduled follow up.    Patient was given instructions and counseling regarding condition or for health maintenance advice.  Please see specific information pulled into the AVS if appropriate.          Emili Iraheta, APRN  Family Medicine  [facility] [place] [facility]  07/24/23  10:13 EDT

## 2023-07-27 ENCOUNTER — OFFICE VISIT (OUTPATIENT)
Dept: FAMILY MEDICINE CLINIC | Facility: CLINIC | Age: 73
End: 2023-07-27
Payer: MEDICARE

## 2023-07-27 ENCOUNTER — HOSPITAL ENCOUNTER (OUTPATIENT)
Dept: GENERAL RADIOLOGY | Facility: HOSPITAL | Age: 73
Discharge: HOME | End: 2023-07-27
Admitting: NURSE PRACTITIONER
Payer: MEDICARE

## 2023-07-27 VITALS
SYSTOLIC BLOOD PRESSURE: 140 MMHG | HEART RATE: 94 BPM | TEMPERATURE: 98 F | WEIGHT: 173 LBS | BODY MASS INDEX: 28.82 KG/M2 | OXYGEN SATURATION: 97 % | HEIGHT: 65 IN | DIASTOLIC BLOOD PRESSURE: 80 MMHG

## 2023-07-27 DIAGNOSIS — K59.00 CONSTIPATION, UNSPECIFIED CONSTIPATION TYPE: Primary | ICD-10-CM

## 2023-07-27 DIAGNOSIS — R14.0 ABDOMINAL BLOATING: ICD-10-CM

## 2023-07-27 PROCEDURE — 99213 OFFICE O/P EST LOW 20 MIN: CPT | Performed by: NURSE PRACTITIONER

## 2023-07-27 PROCEDURE — 74018 RADEX ABDOMEN 1 VIEW: CPT

## 2023-07-27 PROCEDURE — 3044F HG A1C LEVEL LT 7.0%: CPT | Performed by: NURSE PRACTITIONER

## 2023-07-27 NOTE — PROGRESS NOTES
Patient ID: Mercedes Santos is a 73 y.o. female     Patient Care Team:  Head, ESTHER Oliveira as PCP - General (Nurse Practitioner)    Subjective     Chief Complaint   Patient presents with    Constipation       Mercedes Santos presents to [facility] today for constipation.     Constipation  This is a recurrent problem. The current episode started 1 to 4 weeks ago. The problem has been gradually worsening since onset. Her stool frequency is 1 time per day. The stool is described as pencil thin. There has Been adequate water intake. Associated symptoms include bloating, flatus and nausea. Pertinent negatives include no fever, hematochezia or melena. She has tried laxatives and enemas for the symptoms. The treatment provided mild relief.   She was seen a few days ago and discussed treatment options for constipation.  Had advised to take MiraLAX daily.  She has taken as directed without improvement of symptoms.  Symptoms are now starting affect her appetite.      She denies any complaints of fever, chills, cough, chest pain, shortness of air, nausea, or any other concerns.     The following portions of the patient's history were reviewed and updated as appropriate: allergies, current medications, past family history, past medical history, past social history, past surgical history and problem list.       Review of Systems   Constitutional: Negative for fever.   Gastrointestinal:  Positive for bloating, constipation, flatus and nausea. Negative for hematochezia and melena.     Vitals:    07/27/23 1255   BP: 140/80   Pulse: 94   Temp: 98 °F (36.7 °C)   SpO2: 97%       Documented weights    07/27/23 1255   Weight: 78.5 kg (173 lb)     Body mass index is 28.79 kg/m².    Results for orders placed or performed in visit on 02/02/23   TSH Rfx On Abnormal To Free T4    Specimen: Blood   Result Value Ref Range    TSH 4.98 (H) 0.27 - 4.20 uIU/mL   Lipid Panel With / Chol / HDL Ratio    Specimen: Blood    Result Value Ref Range    Total Cholesterol 273 (H) 0 - 200 mg/dL    Triglycerides 128 0 - 150 mg/dL    HDL Cholesterol 59 40 - 60 mg/dL    VLDL Cholesterol Federico 23 5 - 40 mg/dL    LDL Chol Calc (NIH) 191 (H) 0 - 100 mg/dL    Chol/HDL Ratio 4.63    Hemoglobin A1c    Specimen: Blood   Result Value Ref Range    Hemoglobin A1C 6.50 (H) 4.80 - 5.60 %   Comprehensive Metabolic Panel    Specimen: Blood   Result Value Ref Range    Glucose 143 (H) 65 - 99 mg/dL    BUN 17 8 - 23 mg/dL    Creatinine 0.86 0.57 - 1.00 mg/dL    EGFR Result 71.9 >60.0 mL/min/1.73    BUN/Creatinine Ratio 19.8 7.0 - 25.0    Sodium 141 136 - 145 mmol/L    Potassium 4.3 3.5 - 5.2 mmol/L    Chloride 104 98 - 107 mmol/L    Total CO2 26.7 22.0 - 29.0 mmol/L    Calcium 9.6 8.6 - 10.5 mg/dL    Total Protein 6.7 6.0 - 8.5 g/dL    Albumin 4.6 3.5 - 5.2 g/dL    Globulin 2.1 gm/dL    A/G Ratio 2.2 g/dL    Total Bilirubin 0.5 0.0 - 1.2 mg/dL    Alkaline Phosphatase 99 39 - 117 U/L    AST (SGOT) 16 1 - 32 U/L    ALT (SGPT) 16 1 - 33 U/L   CBC (No Diff)    Specimen: Blood   Result Value Ref Range    WBC 4.84 3.40 - 10.80 10*3/mm3    RBC 4.63 3.77 - 5.28 10*6/mm3    Hemoglobin 14.4 12.0 - 15.9 g/dL    Hematocrit 43.0 34.0 - 46.6 %    MCV 92.9 79.0 - 97.0 fL    MCH 31.1 26.6 - 33.0 pg    MCHC 33.5 31.5 - 35.7 g/dL    RDW 13.3 12.3 - 15.4 %    Platelets 233 140 - 450 10*3/mm3   T4, Free   Result Value Ref Range    Free T4 1.08 ng/dL           Objective     Physical Exam  Vitals reviewed.   Constitutional:       General: She is not in acute distress.  Cardiovascular:      Rate and Rhythm: Normal rate.   Pulmonary:      Effort: Pulmonary effort is normal.   Abdominal:      General: Bowel sounds are decreased.      Palpations: Abdomen is soft. There is no hepatomegaly or splenomegaly.      Comments: Lower abdomen tenderness   Neurological:      Mental Status: She is alert.          Assessment & Plan     Assessment/Plan     Diagnoses and all orders for this  visit:    1. Constipation, unspecified constipation type (Primary)  -     XR Abdomen KUB    2. Abdominal bloating  -     XR Abdomen KUB      Xray today to determine further recommendations.    Continue to increase fluids and fiber.        In the meantime, instructed to contact us sooner for any problems or concerns.    Follow Up:  Return if symptoms worsen or fail to improve, for Next scheduled follow up.    Patient was given instructions and counseling regarding condition or for health maintenance advice.  Please see specific information pulled into the AVS if appropriate.          Emili Iraheta, APRN  Family Medicine  [facility] [place]  07/27/23  14:19 EDT

## 2023-07-28 ENCOUNTER — PATIENT ROUNDING (BHMG ONLY) (OUTPATIENT)
Dept: FAMILY MEDICINE CLINIC | Facility: CLINIC | Age: 73
End: 2023-07-28
Payer: MEDICARE

## 2023-08-28 ENCOUNTER — OFFICE VISIT (OUTPATIENT)
Dept: FAMILY MEDICINE CLINIC | Facility: CLINIC | Age: 73
End: 2023-08-28
Payer: MEDICARE

## 2023-08-28 VITALS
SYSTOLIC BLOOD PRESSURE: 120 MMHG | HEIGHT: 65 IN | OXYGEN SATURATION: 96 % | HEART RATE: 93 BPM | TEMPERATURE: 97.1 F | BODY MASS INDEX: 28.99 KG/M2 | WEIGHT: 174 LBS | DIASTOLIC BLOOD PRESSURE: 72 MMHG

## 2023-08-28 DIAGNOSIS — E78.2 MIXED HYPERLIPIDEMIA: ICD-10-CM

## 2023-08-28 DIAGNOSIS — K59.00 CONSTIPATION, UNSPECIFIED CONSTIPATION TYPE: ICD-10-CM

## 2023-08-28 DIAGNOSIS — E11.65 TYPE 2 DIABETES MELLITUS WITH HYPERGLYCEMIA, WITHOUT LONG-TERM CURRENT USE OF INSULIN: ICD-10-CM

## 2023-08-28 DIAGNOSIS — R79.89 ELEVATED TSH: ICD-10-CM

## 2023-08-28 DIAGNOSIS — T78.40XA ALLERGY, INITIAL ENCOUNTER: ICD-10-CM

## 2023-08-28 DIAGNOSIS — E13.65 OTHER SPECIFIED DIABETES MELLITUS WITH HYPERGLYCEMIA, WITHOUT LONG-TERM CURRENT USE OF INSULIN: ICD-10-CM

## 2023-08-28 DIAGNOSIS — Z00.00 MEDICARE ANNUAL WELLNESS VISIT, SUBSEQUENT: Primary | ICD-10-CM

## 2023-08-28 PROCEDURE — 99214 OFFICE O/P EST MOD 30 MIN: CPT | Performed by: NURSE PRACTITIONER

## 2023-08-28 PROCEDURE — G0439 PPPS, SUBSEQ VISIT: HCPCS | Performed by: NURSE PRACTITIONER

## 2023-08-28 PROCEDURE — 1159F MED LIST DOCD IN RCRD: CPT | Performed by: NURSE PRACTITIONER

## 2023-08-28 PROCEDURE — 1160F RVW MEDS BY RX/DR IN RCRD: CPT | Performed by: NURSE PRACTITIONER

## 2023-08-28 PROCEDURE — 3044F HG A1C LEVEL LT 7.0%: CPT | Performed by: NURSE PRACTITIONER

## 2023-08-28 PROCEDURE — 1170F FXNL STATUS ASSESSED: CPT | Performed by: NURSE PRACTITIONER

## 2023-08-28 RX ORDER — OLOPATADINE HYDROCHLORIDE 2 MG/ML
1 SOLUTION/ DROPS OPHTHALMIC DAILY
Qty: 2.5 ML | Refills: 2 | Status: SHIPPED | OUTPATIENT
Start: 2023-08-28

## 2023-08-28 NOTE — PROGRESS NOTES
The ABCs of the Annual Wellness Visit  Subsequent Medicare Wellness Visit    Subjective    Mercedes Santos is a 73 y.o. female who presents for a Subsequent Medicare Wellness Visit.    The following portions of the patient's history were reviewed and   updated as appropriate: allergies, current medications, past family history, past medical history, past social history, past surgical history, and problem list.    Compared to one year ago, the patient feels her physical   health is worse. Worsening GI issues of constipation.      Compared to one year ago, the patient feels her mental   health is worse. Mild depression however does not feel she needs medication.      Recent Hospitalizations:  She was not admitted to the hospital during the last year.       Current Medical Providers:  Patient Care Team:  Head, ESTHER Oliveira as PCP - General (Nurse Practitioner)    Outpatient Medications Prior to Visit   Medication Sig Dispense Refill    aspirin-sod bicarb-citric acid ([place]) 325 MG effervescent tablet       metFORMIN (GLUCOPHAGE) 500 MG tablet Take 1 tablet by mouth Daily With Breakfast. 90 tablet 3    multivitamin (THERAGRAN) tablet tablet Take  by mouth Daily.      aspirin 325 MG tablet Take 1 tablet by mouth.       No facility-administered medications prior to visit.       No opioid medication identified on active medication list. I have reviewed chart for other potential  high risk medication/s and harmful drug interactions in the elderly.        Aspirin is on active medication list. Aspirin use is indicated based on review of current medical condition/s. Pros and cons of this therapy have been discussed today. Benefits of this medication outweigh potential harm.  Patient has been encouraged to continue taking this medication.  .      Patient Active Problem List   Diagnosis    Diabetes mellitus    History of breast cancer    Mixed hyperlipidemia     Advance Care Planning   Advance Care Planning     Advance  "Directive is not on file.  ACP discussion was held with the patient during this visit. Patient does not have an advance directive, declines further assistance.     Objective    Vitals:    23 1313   BP: 120/72   Pulse: 93   Temp: 97.1 øF (36.2 øC)   SpO2: 96%   Weight: 78.9 kg (174 lb)   Height: 165.1 cm (65\")     Estimated body mass index is 28.96 kg/mý as calculated from the following:    Height as of this encounter: 165.1 cm (65\").    Weight as of this encounter: 78.9 kg (174 lb).    BMI is >= 25 and <30. (Overweight) The following options were offered after discussion;: exercise counseling/recommendations and nutrition counseling/recommendations      Does the patient have evidence of cognitive impairment? No    Lab Results   Component Value Date    CHLPL 310 (H) 2023    TRIG 146 2023    HDL 56 2023     (H) 2023    VLDL 27 2023    HGBA1C 6.60 (H) 2023        HEALTH RISK ASSESSMENT    Smoking Status:  Social History     Tobacco Use   Smoking Status Former    Packs/day: 0.25    Years: 3.00    Pack years: 0.75    Types: Cigarettes    Quit date: 1969    Years since quittin.6   Smokeless Tobacco Never     Alcohol Consumption:  Social History     Substance and Sexual Activity   Alcohol Use None     Fall Risk Screen:    STEADI Fall Risk Assessment was completed, and patient is at HIGH risk for falls. Assessment completed on:2023   Fell from  sidewalk on mother's Day.     Depression Screenin/28/2023     1:18 PM   PHQ-2/PHQ-9 Depression Screening   Little Interest or Pleasure in Doing Things 0-->not at all   Feeling Down, Depressed or Hopeless 0-->not at all   PHQ-9: Brief Depression Severity Measure Score 0       Health Habits and Functional and Cognitive Screenin/28/2023     1:17 PM   Functional & Cognitive Status   Do you have difficulty preparing food and eating? No   Do you have difficulty bathing yourself, getting dressed or grooming " yourself? No   Do you have difficulty using the toilet? No   Do you have difficulty moving around from place to place? No   Do you have trouble with steps or getting out of a bed or a chair? No   Current Diet Well Balanced Diet   Dental Exam Up to date   Eye Exam Up to date   Exercise (times per week) 7 times per week   Current Exercises Include Walking   Do you need help using the phone?  No   Are you deaf or do you have serious difficulty hearing?  No   Do you need help to go to places out of walking distance? No   Do you need help shopping? No   Do you need help preparing meals?  No   Do you need help with housework?  No   Do you need help with laundry? No   Do you need help taking your medications? No   Do you need help managing money? No   Do you ever drive or ride in a car without wearing a seat belt? No       Age-appropriate Screening Schedule:  Refer to the list below for future screening recommendations based on patient's age, sex and/or medical conditions. Orders for these recommended tests are listed in the plan section. The patient has been provided with a written plan.    Health Maintenance   Topic Date Due    COLORECTAL CANCER SCREENING  Never done    HEPATITIS C SCREENING  08/28/2023 (Originally 2/2/2023)    DXA SCAN  08/28/2023 (Originally 1950)    ZOSTER VACCINE (1 of 2) 08/28/2023 (Originally 2/6/2001)    COVID-19 Vaccine (1) 08/30/2023 (Originally 1950)    DIABETIC FOOT EXAM  09/12/2023 (Originally 2/2/2023)    URINE MICROALBUMIN  09/15/2023 (Originally 8/23/2023)    Pneumococcal Vaccine 65+ (1 - PCV) 08/28/2024 (Originally 2/6/1956)    TDAP/TD VACCINES (1 - Tdap) 08/28/2024 (Originally 2/6/1970)    INFLUENZA VACCINE  10/01/2023    HEMOGLOBIN A1C  02/21/2024    DIABETIC EYE EXAM  03/14/2024    LIPID PANEL  08/21/2024    ANNUAL WELLNESS VISIT  08/28/2024    MAMMOGRAM  09/23/2024                  CMS Preventative Services Quick Reference  Risk Factors Identified During  "Encounter  Immunizations Discussed/Encouraged: Prevnar 20 (Pneumococcal 20-valent conjugate), Shingrix, and COVID19  Dental Screening Recommended  Vision Screening Recommended  The above risks/problems have been discussed with the patient.  Pertinent information has been shared with the patient in the After Visit Summary.  An After Visit Summary and PPPS were made available to the patient.    Follow Up:   Next Medicare Wellness visit to be scheduled in 1 year.       Additional E&M Note during same encounter follows:  Patient has multiple medical problems which are significant and separately identifiable that require additional work above and beyond the Medicare Wellness Visit.      Chief Complaint  Medicare Wellness-subsequent and Diabetes    Subjective        HPI  Mercedes Santos is also being seen today for continued management of type 2 diabetes.  This is managed with metformin 500 mg daily.    Issues with constipation.  Had taken magnesium citrate which helped. Last BM yesterday.  She has been referred for screening colonoscopy however is yet to be scheduled.    Diet: Admits to eating eggs daily.  Whipped cream in coffee.    Has changed diet to egg whites and oatmeal since seeing her recent lab results including her lipid panel.  Exercise:  30 minute walk daily.    Diabetes:  Blood sugars run around 118.    Vision changes and \"blood shot\".  White halo in vision right eye.  Not present currently. Onset 2-3 weeks ago.  Occurred after deep cleaning.   Also was having dizziness.   Moved here from [place] in December.  Has had no prior history with allergies in the past.    Diabetic eye exam:  DIABETIC EYE EXAM - SCAN - DM EYE EXAM, BENET&BLOOM 3/14/2023 (03/14/2023)    Scheduled for mammogram and ultrasound tomorrow.  History of breast cancer.  Has been referred to GI for screening colonoscopy.          Objective   Vital Signs:  /72   Pulse 93   Temp 97.1 øF (36.2 øC)   Ht 165.1 cm (65\")   Wt 78.9 kg (174 lb) "   SpO2 96%   BMI 28.96 kg/mý     Physical Exam  Constitutional:       Appearance: She is well-developed.   HENT:      Head: Normocephalic and atraumatic.      Right Ear: Tympanic membrane normal.      Left Ear: Tympanic membrane normal.      Nose:      Right Sinus: No maxillary sinus tenderness or frontal sinus tenderness.      Left Sinus: No maxillary sinus tenderness or frontal sinus tenderness.   Eyes:      Extraocular Movements: Extraocular movements intact.      Conjunctiva/sclera:      Right eye: Right conjunctiva is injected.      Left eye: Left conjunctiva is injected.      Pupils: Pupils are equal, round, and reactive to light.   Cardiovascular:      Rate and Rhythm: Normal rate and regular rhythm.      Heart sounds: Normal heart sounds. No murmur heard.  Pulmonary:      Effort: Pulmonary effort is normal.      Breath sounds: Normal breath sounds. No wheezing, rhonchi or rales.   Abdominal:      General: Bowel sounds are increased. There is distension.      Palpations: Abdomen is soft. There is no hepatomegaly or splenomegaly.      Tenderness: There is no abdominal tenderness.   Musculoskeletal:         General: No tenderness. Normal range of motion.      Cervical back: Normal range of motion and neck supple.   Skin:     General: Skin is warm and dry.      Findings: No erythema or rash.   Neurological:      Mental Status: She is alert and oriented to person, place, and time.   Psychiatric:         Behavior: Behavior normal.        The following data was reviewed by: Emili Iraheta, APRAIYANA on 08/28/2023:  Common labs          2/6/2023    10:43 8/21/2023    09:23   Common Labs   Glucose 143  150    BUN 17  19    Creatinine 0.86  0.95    Sodium 141  142    Potassium 4.3  4.4    Chloride 104  107    Calcium 9.6  9.5    Total Protein 6.7  6.5    Albumin 4.6  4.5    Total Bilirubin 0.5  0.6    Alkaline Phosphatase 99  88    AST (SGOT) 16  15    ALT (SGPT) 16  17    WBC 4.84     Hemoglobin 14.4     Hematocrit 43.0      Platelets 233     Total Cholesterol 273  310    Triglycerides 128  146    HDL Cholesterol 59  56    LDL Cholesterol  191  227    Hemoglobin A1C 6.50  6.60            Summary:  Mercedes Santos continues to have problems with abdominal distention and constipation since she was last seen.  X-ray which was completed showed large amount of stool burden.  States when she took the magnesium citrate, she had improvement in symptoms.  She also plans to start a magnesium citrate daily supplement.  Offered CT scan of abdomen and pelvis however she states she will proceed with the colonoscopy as directed.  She is to let us know if symptoms worsen in the meantime.  Reviewed recent labs along with patient.  Her type 2 diabetes is well controlled with hemoglobin A1c of 6.6.  Her TSH level remains slightly elevated at 4.96.  Free T4 is slightly low at 0.92.  Due to continued elevated TSH, recommend starting low-dose levothyroxine 25 mcg daily.  However patient wishes to monitor for now.  Has not noticed any worsening fatigue, unintentional weight gain, or any other concerns.  Cholesterol levels have also increased.  Total cholesterol is now 310.  LDL was 227.  Has not ever taken statin medication in the past.  She wishes to manage this with diet and exercise.  We will continue to monitor.  We will have her return in 6 months for next recheck appointment with fasting labs.  In the meantime, instructed contact us sooner for any problems or concerns.     Assessment and Plan   Diagnoses and all orders for this visit:    1. Medicare annual wellness visit, subsequent (Primary)    2. Type 2 diabetes mellitus with hyperglycemia, without long-term current use of insulin  -     glucose blood test strip; Use as instructed  Dispense: 100 each; Refill: 11  -     CBC (No Diff); Future  -     Comprehensive Metabolic Panel; Future  -     Lipid Panel With / Chol / HDL Ratio; Future  -     Hemoglobin A1c; Future  -     TSH; Future    3. Allergy,  initial encounter  -     olopatadine (PATADAY) 0.2 % solution ophthalmic solution; Administer 1 drop to both eyes Daily.  Dispense: 2.5 mL; Refill: 2   May also try OTC Claritin or Zyrtec daily.     Let us know if no improvement.      4. Elevated TSH  -     T3, Free; Future  -     T4, Free; Future  -     TSH; Future    5. Mixed hyperlipidemia  -     Comprehensive Metabolic Panel; Future  -     Lipid Panel With / Chol / HDL Ratio; Future  -     Hemoglobin A1c; Future    6. Constipation, unspecified constipation type  -     CBC (No Diff); Future  -     Comprehensive Metabolic Panel; Future    7. Other specified diabetes mellitus with hyperglycemia, without long-term current use of insulin  -     T4, Free; Future             Follow Up   Return in about 6 months (around 2/28/2024) for Recheck with fasting labs the week before. .  Patient was given instructions and counseling regarding her condition or for health maintenance advice. Please see specific information pulled into the AVS if appropriate.       Patient Counseling:  --Nutrition: Stressed importance of moderation in sodium/caffeine intake, saturated fat and cholesterol.  Discussed caloric balance, sufficient intake of fresh fruits, vegetables, fiber,   calcium, iron.  --Discussed the new recommendation against daily use of baby aspirin for primary prevention in low risk patients.  --Exercise: Stressed the importance of regular exercise by incorporating into daily routine.    --Substance Abuse: Discussed cessation/primary prevention of tobacco, alcohol, or other drug use; driving or other dangerous activities under the influence.    --Dental health: Discussed importance of regular tooth brushing, flossing, and dental visits.  -- Suggested having eyes and vision checked if needed or past due.  --Immunizations reviewed. Refusing Covid.  Has had Covid X 3.    --Discussed benefits of screening colonoscopy.        Emili Iraheta, APRN

## 2023-08-29 ENCOUNTER — HOSPITAL ENCOUNTER (OUTPATIENT)
Dept: MAMMOGRAPHY | Facility: HOSPITAL | Age: 73
Discharge: HOME | End: 2023-08-29
Admitting: NURSE PRACTITIONER
Payer: MEDICARE

## 2023-08-29 ENCOUNTER — HOSPITAL ENCOUNTER (OUTPATIENT)
Dept: ULTRASOUND IMAGING | Facility: HOSPITAL | Age: 73
Discharge: HOME | End: 2023-08-29
Payer: MEDICARE

## 2023-08-29 DIAGNOSIS — N64.4 BREAST PAIN, LEFT: ICD-10-CM

## 2023-08-29 DIAGNOSIS — Z85.3 HISTORY OF BREAST CANCER: ICD-10-CM

## 2023-08-29 DIAGNOSIS — M79.629 PAIN IN AXILLA, UNSPECIFIED LATERALITY: ICD-10-CM

## 2023-08-29 PROCEDURE — G0279 TOMOSYNTHESIS, MAMMO: HCPCS

## 2023-08-29 PROCEDURE — 76642 ULTRASOUND BREAST LIMITED: CPT

## 2023-08-29 PROCEDURE — 77066 DX MAMMO INCL CAD BI: CPT

## 2023-11-02 ENCOUNTER — PREP FOR SURGERY (OUTPATIENT)
Dept: SURGERY | Facility: SURGERY CENTER | Age: 73
End: 2023-11-02
Payer: MEDICARE

## 2023-11-02 DIAGNOSIS — Z12.11 ENCOUNTER FOR SCREENING FOR MALIGNANT NEOPLASM OF COLON: Primary | ICD-10-CM

## 2023-11-03 PROBLEM — Z12.11 ENCOUNTER FOR SCREENING FOR MALIGNANT NEOPLASM OF COLON: Status: ACTIVE | Noted: 2023-11-02

## 2024-01-04 ENCOUNTER — HOSPITAL ENCOUNTER (OUTPATIENT)
Dept: HOSPITAL 47 - RADCTMAIN | Age: 74
Discharge: HOME | End: 2024-01-04
Attending: FAMILY MEDICINE
Payer: MEDICARE

## 2024-01-04 DIAGNOSIS — R19.7: ICD-10-CM

## 2024-01-04 DIAGNOSIS — R63.4: Primary | ICD-10-CM

## 2024-01-04 PROCEDURE — 74150 CT ABDOMEN W/O CONTRAST: CPT

## 2024-01-16 ENCOUNTER — ANESTHESIA EVENT (OUTPATIENT)
Dept: SURGERY | Facility: SURGERY CENTER | Age: 74
End: 2024-01-16
Payer: MEDICARE

## 2024-01-16 ENCOUNTER — HOSPITAL ENCOUNTER (OUTPATIENT)
Facility: SURGERY CENTER | Age: 74
Setting detail: HOSPITAL OUTPATIENT SURGERY
Discharge: HOME | End: 2024-01-16
Attending: INTERNAL MEDICINE | Admitting: INTERNAL MEDICINE
Payer: MEDICARE

## 2024-01-16 ENCOUNTER — ANESTHESIA (OUTPATIENT)
Dept: SURGERY | Facility: SURGERY CENTER | Age: 74
End: 2024-01-16
Payer: MEDICARE

## 2024-01-16 VITALS
RESPIRATION RATE: 16 BRPM | HEIGHT: 65 IN | HEART RATE: 77 BPM | SYSTOLIC BLOOD PRESSURE: 133 MMHG | TEMPERATURE: 98.4 F | OXYGEN SATURATION: 95 % | WEIGHT: 178 LBS | DIASTOLIC BLOOD PRESSURE: 66 MMHG | BODY MASS INDEX: 29.66 KG/M2

## 2024-01-16 DIAGNOSIS — Z12.11 ENCOUNTER FOR SCREENING FOR MALIGNANT NEOPLASM OF COLON: ICD-10-CM

## 2024-01-16 LAB — GLUCOSE BLDC GLUCOMTR-MCNC: 147 MG/DL (ref 70–130)

## 2024-01-16 PROCEDURE — 25010000002 LIDOCAINE 1 % SOLUTION: Performed by: STUDENT IN AN ORGANIZED HEALTH CARE EDUCATION/TRAINING PROGRAM

## 2024-01-16 PROCEDURE — 45380 COLONOSCOPY AND BIOPSY: CPT | Performed by: INTERNAL MEDICINE

## 2024-01-16 PROCEDURE — 25010000002 PROPOFOL 1000 MG/100ML EMULSION: Performed by: STUDENT IN AN ORGANIZED HEALTH CARE EDUCATION/TRAINING PROGRAM

## 2024-01-16 PROCEDURE — 88305 TISSUE EXAM BY PATHOLOGIST: CPT | Performed by: INTERNAL MEDICINE

## 2024-01-16 PROCEDURE — 25810000003 LACTATED RINGERS PER 1000 ML: Performed by: INTERNAL MEDICINE

## 2024-01-16 RX ORDER — LIDOCAINE HYDROCHLORIDE 10 MG/ML
0.5 INJECTION, SOLUTION INFILTRATION; PERINEURAL ONCE AS NEEDED
Status: DISCONTINUED | OUTPATIENT
Start: 2024-01-16 | End: 2024-01-16 | Stop reason: HOSPADM

## 2024-01-16 RX ORDER — SODIUM CHLORIDE 0.9 % (FLUSH) 0.9 %
10 SYRINGE (ML) INJECTION AS NEEDED
Status: DISCONTINUED | OUTPATIENT
Start: 2024-01-16 | End: 2024-01-16 | Stop reason: HOSPADM

## 2024-01-16 RX ORDER — LIDOCAINE HYDROCHLORIDE 10 MG/ML
INJECTION, SOLUTION INFILTRATION; PERINEURAL AS NEEDED
Status: DISCONTINUED | OUTPATIENT
Start: 2024-01-16 | End: 2024-01-16 | Stop reason: SURG

## 2024-01-16 RX ORDER — MAGNESIUM HYDROXIDE 1200 MG/15ML
LIQUID ORAL AS NEEDED
Status: DISCONTINUED | OUTPATIENT
Start: 2024-01-16 | End: 2024-01-16 | Stop reason: HOSPADM

## 2024-01-16 RX ORDER — PROPOFOL 10 MG/ML
INJECTION, EMULSION INTRAVENOUS AS NEEDED
Status: DISCONTINUED | OUTPATIENT
Start: 2024-01-16 | End: 2024-01-16 | Stop reason: SURG

## 2024-01-16 RX ORDER — SODIUM CHLORIDE, SODIUM LACTATE, POTASSIUM CHLORIDE, CALCIUM CHLORIDE 600; 310; 30; 20 MG/100ML; MG/100ML; MG/100ML; MG/100ML
1000 INJECTION, SOLUTION INTRAVENOUS CONTINUOUS
Status: DISCONTINUED | OUTPATIENT
Start: 2024-01-16 | End: 2024-01-16 | Stop reason: HOSPADM

## 2024-01-16 RX ADMIN — PROPOFOL 70 MG: 10 INJECTION, EMULSION INTRAVENOUS at 09:47

## 2024-01-16 RX ADMIN — LIDOCAINE HYDROCHLORIDE 30 MG: 10 INJECTION, SOLUTION INFILTRATION; PERINEURAL at 09:45

## 2024-01-16 RX ADMIN — PROPOFOL 180 MCG/KG/MIN: 10 INJECTION, EMULSION INTRAVENOUS at 09:48

## 2024-01-16 RX ADMIN — SODIUM CHLORIDE, POTASSIUM CHLORIDE, SODIUM LACTATE AND CALCIUM CHLORIDE 1000 ML: 600; 310; 30; 20 INJECTION, SOLUTION INTRAVENOUS at 09:09

## 2024-01-16 NOTE — ANESTHESIA PREPROCEDURE EVALUATION
" Anesthesia Evaluation     Patient summary reviewed and Nursing notes reviewed   no history of anesthetic complications:   NPO Solid Status: > 8 hours  NPO Liquid Status: > 2 hours           Airway   Mallampati: II  TM distance: >3 FB  Neck ROM: full  No difficulty expected  Dental      Comment: caps    Pulmonary - normal exam   (-) COPD, asthma  Cardiovascular   Exercise tolerance: good (4-7 METS)    Rhythm: regular    (+) hyperlipidemia  (-) past MI, angina, cardiac stents      Neuro/Psych  (-) seizures, CVA  GI/Hepatic/Renal/Endo    (+) diabetes mellitus  (-) GERD, liver disease, no renal disease    Musculoskeletal     Abdominal    Substance History - negative use     OB/GYN          Other      history of cancer (breast)                      Anesthesia Plan    ASA 2     MAC     (I have reviewed the patient's history and chart with the patient, including all pertinent laboratory results and imaging. I have explained the risks of monitored anesthesia care including but not limited to respiratory depression, possible need for airway intervention, or possible intra-op recall. I explained that airway intervention involves risk of possible dental injury.    VITALS:  Ht 165.1 cm (65\")   Wt 79.4 kg (175 lb)   BMI 29.12 kg/m² )  intravenous induction     Anesthetic plan, risks, benefits, and alternatives have been provided, discussed and informed consent has been obtained with: patient.  Pre-procedure education provided      CODE STATUS:         "

## 2024-01-16 NOTE — ANESTHESIA POSTPROCEDURE EVALUATION
Patient: Mercedes Santos    Procedure Summary       Date: 01/16/24 Room / Location: [facility]  / [facility]    Anesthesia Start: 0943 Anesthesia Stop: 1009    Procedure: COLONOSCOPY TO CECUM Diagnosis:       Encounter for screening for malignant neoplasm of colon      (Encounter for screening for malignant neoplasm of colon [Z12.11])    Surgeons: Brian Clayton MD Provider: Sandra Norman MD    Anesthesia Type: MAC ASA Status: 2            Anesthesia Type: MAC    Vitals  Vitals Value Taken Time   /66 01/16/24 1027   Temp 36.9 °C (98.4 °F) 01/16/24 1007   Pulse 77 01/16/24 1027   Resp 16 01/16/24 1027   SpO2 95 % 01/16/24 1027           Post Anesthesia Care and Evaluation    Patient location during evaluation: PHASE II  Level of consciousness: awake  Pain management: adequate    Airway patency: patent  Anesthetic complications: No anesthetic complications  PONV Status: none  Cardiovascular status: acceptable  Respiratory status: acceptable  Hydration status: acceptable

## 2024-01-16 NOTE — H&P
No chief complaint on file.      HPI  screening         Problem List:    Patient Active Problem List   Diagnosis    Diabetes mellitus    History of breast cancer    Mixed hyperlipidemia    Encounter for screening for malignant neoplasm of colon       Medical History:    Past Medical History:   Diagnosis Date    Breast cancer 2016    Diabetes mellitus         Social History:    Social History     Socioeconomic History    Marital status:    Tobacco Use    Smoking status: Former     Packs/day: 0.25     Years: 3.00     Additional pack years: 0.00     Total pack years: 0.75     Types: Cigarettes     Quit date:      Years since quittin.0    Smokeless tobacco: Never   Vaping Use    Vaping Use: Never used       Family History:   Family History   Problem Relation Age of Onset    Asthma Mother     Glaucoma Mother     Heart disease Father     Diabetes Father     Diabetes Sister        Surgical History:   Past Surgical History:   Procedure Laterality Date    MASTECTOMY PARTIAL / LUMPECTOMY W/ AXILLARY LYMPHADENECTOMY Right 2016       No current facility-administered medications for this encounter.    Current Outpatient Medications:     aspirin-sod bicarb-citric acid ([place]) 325 MG effervescent tablet, , Disp: , Rfl:     glucose blood test strip, Use as instructed, Disp: 100 each, Rfl: 11    metFORMIN (GLUCOPHAGE) 500 MG tablet, Take 1 tablet by mouth Daily With Breakfast., Disp: 90 tablet, Rfl: 3    multivitamin (THERAGRAN) tablet tablet, Take  by mouth Daily., Disp: , Rfl:     olopatadine (PATADAY) 0.2 % solution ophthalmic solution, Administer 1 drop to both eyes Daily., Disp: 2.5 mL, Rfl: 2    Allergies:   Allergies   Allergen Reactions    Aspartame Other (See Comments)     Heart racing, with neurological side effects     Letrozole Other (See Comments)     Joint stiffness    Penicillins Rash        The following portions of the patient's history were reviewed by me and updated as appropriate: review of  systems, allergies, current medications, past family history, past medical history, past social history, past surgical history and problem list.    There were no vitals filed for this visit.    PHYSICAL EXAM:    CONSTITUTIONAL:  today's vital signs reviewed by me  GASTROINTESTINAL: abdomen is soft nontender nondistended with normal active bowel sounds, no masses are appreciated    Assessment/ Plan  Screening    colonoscopy    Risks and benefits as well as alternatives to endoscopic evaluation were explained to the patient and they voiced understanding and wish to proceed.  These risks include but are not limited to the risk of bleeding, perforation, adverse reaction to sedation, and missed lesions.  The patient was given the opportunity to ask questions prior to the endoscopic procedure.

## 2024-01-17 LAB
LAB AP CASE REPORT: NORMAL
LAB AP CLINICAL INFORMATION: NORMAL
PATH REPORT.FINAL DX SPEC: NORMAL
PATH REPORT.GROSS SPEC: NORMAL

## 2024-02-21 DIAGNOSIS — E11.65 TYPE 2 DIABETES MELLITUS WITH HYPERGLYCEMIA, WITHOUT LONG-TERM CURRENT USE OF INSULIN: ICD-10-CM

## 2024-02-21 DIAGNOSIS — E78.2 MIXED HYPERLIPIDEMIA: ICD-10-CM

## 2024-02-21 DIAGNOSIS — R79.89 ELEVATED TSH: ICD-10-CM

## 2024-02-21 DIAGNOSIS — E13.65 OTHER SPECIFIED DIABETES MELLITUS WITH HYPERGLYCEMIA, WITHOUT LONG-TERM CURRENT USE OF INSULIN: ICD-10-CM

## 2024-02-21 DIAGNOSIS — K59.00 CONSTIPATION, UNSPECIFIED CONSTIPATION TYPE: ICD-10-CM

## 2024-02-26 ENCOUNTER — TELEPHONE (OUTPATIENT)
Dept: FAMILY MEDICINE CLINIC | Facility: CLINIC | Age: 74
End: 2024-02-26
Payer: MEDICARE

## 2024-02-26 DIAGNOSIS — E11.65 TYPE 2 DIABETES MELLITUS WITH HYPERGLYCEMIA, WITHOUT LONG-TERM CURRENT USE OF INSULIN: ICD-10-CM

## 2024-02-26 NOTE — TELEPHONE ENCOUNTER
Patient came into the office today for lab work and requested a refill of Metformin.   Please advise.

## 2024-02-28 LAB
ALBUMIN SERPL-MCNC: 4.1 G/DL (ref 3.5–5.2)
ALBUMIN/CREAT UR: 5 MG/G CREAT (ref 0–29)
ALBUMIN/GLOB SERPL: 1.8 G/DL
ALP SERPL-CCNC: 106 U/L (ref 39–117)
ALT SERPL-CCNC: 20 U/L (ref 1–33)
APPEARANCE UR: ABNORMAL
AST SERPL-CCNC: 15 U/L (ref 1–32)
BACTERIA #/AREA URNS HPF: ABNORMAL /HPF
BACTERIA UR CULT: NORMAL
BACTERIA UR CULT: NORMAL
BILIRUB SERPL-MCNC: 0.4 MG/DL (ref 0–1.2)
BILIRUB UR QL STRIP: NEGATIVE
BUN SERPL-MCNC: 13 MG/DL (ref 8–23)
BUN/CREAT SERPL: 11.8 (ref 7–25)
CALCIUM SERPL-MCNC: 9.3 MG/DL (ref 8.6–10.5)
CASTS URNS QL MICRO: ABNORMAL /LPF
CHLORIDE SERPL-SCNC: 104 MMOL/L (ref 98–107)
CHOLEST SERPL-MCNC: 259 MG/DL (ref 0–200)
CHOLEST/HDLC SERPL: 4.8 {RATIO}
CO2 SERPL-SCNC: 21.4 MMOL/L (ref 22–29)
COLOR UR: YELLOW
CREAT SERPL-MCNC: 1.1 MG/DL (ref 0.57–1)
CREAT UR-MCNC: 181.6 MG/DL
CRYSTALS URNS MICRO: ABNORMAL
EGFRCR SERPLBLD CKD-EPI 2021: 53.2 ML/MIN/1.73
EPI CELLS #/AREA URNS HPF: >10 /HPF (ref 0–10)
ERYTHROCYTE [DISTWIDTH] IN BLOOD BY AUTOMATED COUNT: 13.3 % (ref 12.3–15.4)
GLOBULIN SER CALC-MCNC: 2.3 GM/DL
GLUCOSE SERPL-MCNC: 157 MG/DL (ref 65–99)
GLUCOSE UR QL STRIP: NEGATIVE
HBA1C MFR BLD: 7.3 % (ref 4.8–5.6)
HCT VFR BLD AUTO: 45 % (ref 34–46.6)
HDLC SERPL-MCNC: 54 MG/DL (ref 40–60)
HGB BLD-MCNC: 14.7 G/DL (ref 12–15.9)
HGB UR QL STRIP: NEGATIVE
KETONES UR QL STRIP: NEGATIVE
LDLC SERPL CALC-MCNC: 176 MG/DL (ref 0–100)
LEUKOCYTE ESTERASE UR QL STRIP: ABNORMAL
MCH RBC QN AUTO: 30.5 PG (ref 26.6–33)
MCHC RBC AUTO-ENTMCNC: 32.7 G/DL (ref 31.5–35.7)
MCV RBC AUTO: 93.4 FL (ref 79–97)
MICRO URNS: ABNORMAL
MICROALBUMIN UR-MCNC: 9.9 UG/ML
NITRITE UR QL STRIP: NEGATIVE
PH UR STRIP: 5.5 [PH] (ref 5–7.5)
PLATELET # BLD AUTO: 257 10*3/MM3 (ref 140–450)
POTASSIUM SERPL-SCNC: 4.3 MMOL/L (ref 3.5–5.2)
PROT SERPL-MCNC: 6.4 G/DL (ref 6–8.5)
PROT UR QL STRIP: ABNORMAL
RBC # BLD AUTO: 4.82 10*6/MM3 (ref 3.77–5.28)
RBC #/AREA URNS HPF: ABNORMAL /HPF (ref 0–2)
SODIUM SERPL-SCNC: 143 MMOL/L (ref 136–145)
SP GR UR STRIP: 1.02 (ref 1–1.03)
T3FREE SERPL-MCNC: 2.6 PG/ML (ref 2–4.4)
T4 FREE SERPL-MCNC: 1.02 NG/DL (ref 0.93–1.7)
TRIGL SERPL-MCNC: 157 MG/DL (ref 0–150)
TSH SERPL DL<=0.005 MIU/L-ACNC: 4.45 UIU/ML (ref 0.27–4.2)
UNIDENT CRYS URNS QL MICRO: PRESENT /LPF
URINALYSIS REFLEX: ABNORMAL
UROBILINOGEN UR STRIP-MCNC: 0.2 MG/DL (ref 0.2–1)
VLDLC SERPL CALC-MCNC: 29 MG/DL (ref 5–40)
WBC # BLD AUTO: 5.53 10*3/MM3 (ref 3.4–10.8)
WBC #/AREA URNS HPF: ABNORMAL /HPF (ref 0–5)

## 2024-03-08 ENCOUNTER — OFFICE VISIT (OUTPATIENT)
Dept: FAMILY MEDICINE CLINIC | Facility: CLINIC | Age: 74
End: 2024-03-08
Payer: MEDICARE

## 2024-03-08 VITALS
OXYGEN SATURATION: 100 % | TEMPERATURE: 97.5 F | DIASTOLIC BLOOD PRESSURE: 76 MMHG | BODY MASS INDEX: 29.16 KG/M2 | SYSTOLIC BLOOD PRESSURE: 124 MMHG | HEIGHT: 65 IN | HEART RATE: 95 BPM | WEIGHT: 175 LBS

## 2024-03-08 DIAGNOSIS — N28.9 FUNCTION KIDNEY DECREASED: ICD-10-CM

## 2024-03-08 DIAGNOSIS — R79.89 ELEVATED TSH: ICD-10-CM

## 2024-03-08 DIAGNOSIS — E11.65 TYPE 2 DIABETES MELLITUS WITH HYPERGLYCEMIA, WITHOUT LONG-TERM CURRENT USE OF INSULIN: Primary | ICD-10-CM

## 2024-03-08 DIAGNOSIS — E78.2 MIXED HYPERLIPIDEMIA: ICD-10-CM

## 2024-03-08 PROCEDURE — 99213 OFFICE O/P EST LOW 20 MIN: CPT | Performed by: NURSE PRACTITIONER

## 2024-03-08 PROCEDURE — 3051F HG A1C>EQUAL 7.0%<8.0%: CPT | Performed by: NURSE PRACTITIONER

## 2024-03-08 NOTE — PROGRESS NOTES
Patient ID: Mercedes Santos is a 74 y.o. female     Patient Care Team:  Head, ESTHER Oliveira as PCP - General (Nurse Practitioner)  Brian Clayton MD as Consulting Physician (Gastroenterology)  Simi Alvares MD (Pain Medicine)    Subjective     Chief Complaint   Patient presents with    Diabetes       History of Present Illness    Mercedes Santos presents to [facility] today for continued management concerning type 2 diabetes.  Since she was last seen, she feels she has been doing well.  She admits to not following her diet as closely as she previously was.  She will occasionally check her blood sugar at home.  When last checked it was around the 140s.  She has been taking metformin 500 mg daily.    She denies any complaints of fever, chills, cough, chest pain, shortness of air, abdominal pain, nausea, or any other concerns.     Mammo Diagnostic Digital Tomosynthesis Bilateral With CAD (08/29/2023 13:53)  ENDOSCOPY - COLON (01/16/2024)    The following portions of the patient's history were reviewed and updated as appropriate: allergies, current medications, past family history, past medical history, past social history, past surgical history and problem list.       ROS    Vitals:    03/08/24 1127   BP: 124/76   Pulse: 95   Temp: 97.5 °F (36.4 °C)   SpO2: 100%       Documented weights    03/08/24 1127   Weight: 79.4 kg (175 lb)     Body mass index is 29.12 kg/m².    Results for orders placed or performed in visit on 02/21/24   Microalbumin / Creatinine Urine Ratio - Urine, Clean Catch    Specimen: Urine, Clean Catch   Result Value Ref Range    Creatinine, Urine 181.6 Not Estab. mg/dL    Microalbumin, Urine 9.9 Not Estab. ug/mL    Microalbumin/Creatinine Ratio 5 0 - 29 mg/g creat   UA / M With / Rflx Culture([facility] ONLY) - Urine, Clean Catch    Specimen: Urine, Clean Catch   Result Value Ref Range    Specific Gravity, UA 1.024 1.005 - 1.030    pH, UA 5.5 5.0 - 7.5    Color, UA  Yellow Yellow    Appearance, UA Cloudy (A) Clear    Leukocytes, UA 2+ (A) Negative    Protein Trace Negative/Trace    Glucose, UA Negative Negative    Ketones Negative Negative    Blood, UA Negative Negative    Bilirubin, UA Negative Negative    Urobilinogen, UA 0.2 0.2 - 1.0 mg/dL    Nitrite, UA Negative Negative    Microscopic Examination See below:     Urinalysis Reflex Comment    TSH    Specimen: Blood   Result Value Ref Range    TSH 4.450 (H) 0.270 - 4.200 uIU/mL   T4, Free    Specimen: Blood   Result Value Ref Range    Free T4 1.02 0.93 - 1.70 ng/dL   T3, Free    Specimen: Blood   Result Value Ref Range    T3, Free 2.6 2.0 - 4.4 pg/mL   Hemoglobin A1c    Specimen: Blood   Result Value Ref Range    Hemoglobin A1C 7.30 (H) 4.80 - 5.60 %   Lipid Panel With / Chol / HDL Ratio    Specimen: Blood   Result Value Ref Range    Total Cholesterol 259 (H) 0 - 200 mg/dL    Triglycerides 157 (H) 0 - 150 mg/dL    HDL Cholesterol 54 40 - 60 mg/dL    VLDL Cholesterol Federico 29 5 - 40 mg/dL    LDL Chol Calc (NIH) 176 (H) 0 - 100 mg/dL    Chol/HDL Ratio 4.80    Comprehensive Metabolic Panel    Specimen: Blood   Result Value Ref Range    Glucose 157 (H) 65 - 99 mg/dL    BUN 13 8 - 23 mg/dL    Creatinine 1.10 (H) 0.57 - 1.00 mg/dL    EGFR Result 53.2 (L) >60.0 mL/min/1.73    BUN/Creatinine Ratio 11.8 7.0 - 25.0    Sodium 143 136 - 145 mmol/L    Potassium 4.3 3.5 - 5.2 mmol/L    Chloride 104 98 - 107 mmol/L    Total CO2 21.4 (L) 22.0 - 29.0 mmol/L    Calcium 9.3 8.6 - 10.5 mg/dL    Total Protein 6.4 6.0 - 8.5 g/dL    Albumin 4.1 3.5 - 5.2 g/dL    Globulin 2.3 gm/dL    A/G Ratio 1.8 g/dL    Total Bilirubin 0.4 0.0 - 1.2 mg/dL    Alkaline Phosphatase 106 39 - 117 U/L    AST (SGOT) 15 1 - 32 U/L    ALT (SGPT) 20 1 - 33 U/L   CBC (No Diff)    Specimen: Blood   Result Value Ref Range    WBC 5.53 3.40 - 10.80 10*3/mm3    RBC 4.82 3.77 - 5.28 10*6/mm3    Hemoglobin 14.7 12.0 - 15.9 g/dL    Hematocrit 45.0 34.0 - 46.6 %    MCV 93.4 79.0 -  97.0 fL    MCH 30.5 26.6 - 33.0 pg    MCHC 32.7 31.5 - 35.7 g/dL    RDW 13.3 12.3 - 15.4 %    Platelets 257 140 - 450 10*3/mm3   Microscopic Examination -   Result Value Ref Range    WBC, UA 6-10 (A) 0 - 5 /hpf    RBC, UA 0-2 0 - 2 /hpf    Epithelial Cells (non renal) >10 (A) 0 - 10 /hpf    Casts None seen None seen /lpf    Crystals, UA Present (A) N/A /lpf    Crystal Type Calcium Oxalate N/A    Bacteria, UA Many (A) None seen/Few /hpf   Urine Culture, Routine -   Result Value Ref Range    Urine Culture Final report     Result 1 Comment            Objective     Physical Exam  Vitals reviewed.   Constitutional:       General: She is not in acute distress.  HENT:      Head: Normocephalic and atraumatic.   Cardiovascular:      Rate and Rhythm: Normal rate and regular rhythm.      Heart sounds: No murmur heard.  Pulmonary:      Effort: Pulmonary effort is normal.      Breath sounds: Normal breath sounds. No wheezing.   Abdominal:      Palpations: Abdomen is soft.   Musculoskeletal:      Right lower leg: No edema.      Left lower leg: No edema.   Neurological:      Mental Status: She is alert and oriented to person, place, and time.                Assessment & Plan     Assessment/Plan     Diagnoses and all orders for this visit:    1. Type 2 diabetes mellitus with hyperglycemia, without long-term current use of insulin (Primary)  -     Comprehensive Metabolic Panel; Future  -     Hemoglobin A1c; Future  -     metFORMIN (GLUCOPHAGE) 500 MG tablet; Take 1 tablet by mouth 2 (Two) Times a Day With Meals.  Dispense: 180 tablet; Refill: 1  -     glucose blood test strip; Use as instructed  Dispense: 100 each; Refill: 11    2. Function kidney decreased   Monitor    3. Mixed hyperlipidemia   Elevated.  Recommend starting atorvastatin 10 mg daily.  She wishes to hold off for now and complete her own research.    4. Elevated TSH   Asymptomatic.  Monitor      Summary:  Mercedes Sanots has been doing well since she was last seen.   Reviewed recent labs along with patient.  Her hemoglobin A1c has increased to 7.3 from 6.6 previously.  Advised to increase metformin to 500 mg twice a day.  Continue to watch diet limit sugar and carb intake.  Kidney function slightly decreased since last check.  She does admit to not drinking as much water as she should.  No increased NSAID use.  Advised we will continue to monitor.  Will have her return in about 3 months for fasting labs to recheck her hemoglobin A1c and CMP.  Will call her with results.  TSH slightly elevated at 4.45 however free T3 and free T4 are normal.  She does not have any worsening fatigue or unintentional weight gain.  She prefers to continue to monitor this instead of starting thyroid supplement.  Cholesterol levels remain elevated.  She is hesitant in starting statin medication.  Explained the importance of statin medication due to her type 2 diabetes according to ADA guidelines.  She wishes to do further research.  Provided information with after visit summary on atorvastatin.  She is to let me know if she wishes to start this as directed.  Have her follow-up in office in 6 months for next recheck appointment with fasting labs and AWV.        Follow Up:  Return for Fasting labs in 3 months and call and F/U 6 months for AWV with fasting labs week before.  .    In the meantime, instructed to contact us sooner for any problems or concerns.    Patient was given instructions and counseling regarding condition or for health maintenance advice.  Please see specific information pulled into the AVS if appropriate.          Emili Iraheta, APRN  Family Medicine  [facility]  03/08/24  12:22 EST

## 2024-03-11 DIAGNOSIS — E11.65 TYPE 2 DIABETES MELLITUS WITH HYPERGLYCEMIA, WITHOUT LONG-TERM CURRENT USE OF INSULIN: ICD-10-CM

## 2024-07-29 ENCOUNTER — HOSPITAL ENCOUNTER (OUTPATIENT)
Dept: HOSPITAL 47 - RADMRIMAIN | Age: 74
Discharge: HOME | End: 2024-07-29
Attending: ORTHOPAEDIC SURGERY
Payer: MEDICARE

## 2024-07-29 DIAGNOSIS — M25.811: Primary | ICD-10-CM

## 2024-08-20 DIAGNOSIS — R79.89 ELEVATED TSH: Primary | ICD-10-CM

## 2024-08-20 DIAGNOSIS — Z51.81 MEDICATION MONITORING ENCOUNTER: ICD-10-CM

## 2024-08-20 DIAGNOSIS — E11.65 TYPE 2 DIABETES MELLITUS WITH HYPERGLYCEMIA, WITHOUT LONG-TERM CURRENT USE OF INSULIN: ICD-10-CM

## 2024-08-20 DIAGNOSIS — E78.2 MIXED HYPERLIPIDEMIA: ICD-10-CM

## 2024-08-30 ENCOUNTER — OFFICE VISIT (OUTPATIENT)
Dept: FAMILY MEDICINE CLINIC | Facility: CLINIC | Age: 74
End: 2024-08-30
Payer: MEDICARE

## 2024-08-30 VITALS
HEART RATE: 93 BPM | TEMPERATURE: 98 F | OXYGEN SATURATION: 100 % | SYSTOLIC BLOOD PRESSURE: 120 MMHG | HEIGHT: 65 IN | BODY MASS INDEX: 29.16 KG/M2 | DIASTOLIC BLOOD PRESSURE: 80 MMHG | WEIGHT: 175 LBS

## 2024-08-30 DIAGNOSIS — Z13.6 ENCOUNTER FOR SCREENING FOR CORONARY ARTERY DISEASE: ICD-10-CM

## 2024-08-30 DIAGNOSIS — Z00.00 MEDICARE ANNUAL WELLNESS VISIT, SUBSEQUENT: Primary | ICD-10-CM

## 2024-08-30 DIAGNOSIS — E11.65 TYPE 2 DIABETES MELLITUS WITH HYPERGLYCEMIA, WITHOUT LONG-TERM CURRENT USE OF INSULIN: ICD-10-CM

## 2024-08-30 DIAGNOSIS — R29.3 POSTURE ABNORMALITY: ICD-10-CM

## 2024-08-30 DIAGNOSIS — E78.2 MIXED HYPERLIPIDEMIA: ICD-10-CM

## 2024-08-30 DIAGNOSIS — R29.898 DECREASED STRENGTH OF UPPER EXTREMITY: ICD-10-CM

## 2024-08-30 PROCEDURE — 1170F FXNL STATUS ASSESSED: CPT | Performed by: NURSE PRACTITIONER

## 2024-08-30 PROCEDURE — 1160F RVW MEDS BY RX/DR IN RCRD: CPT | Performed by: NURSE PRACTITIONER

## 2024-08-30 PROCEDURE — 99214 OFFICE O/P EST MOD 30 MIN: CPT | Performed by: NURSE PRACTITIONER

## 2024-08-30 PROCEDURE — 3051F HG A1C>EQUAL 7.0%<8.0%: CPT | Performed by: NURSE PRACTITIONER

## 2024-08-30 PROCEDURE — 1159F MED LIST DOCD IN RCRD: CPT | Performed by: NURSE PRACTITIONER

## 2024-08-30 PROCEDURE — G0439 PPPS, SUBSEQ VISIT: HCPCS | Performed by: NURSE PRACTITIONER

## 2024-08-30 NOTE — PROGRESS NOTES
The ABCs of the Annual Wellness Visit  Subsequent Medicare Wellness Visit    Subjective    Mercedes Santos is a 74 y.o. female who presents for a Subsequent Medicare Wellness Visit.    The following portions of the patient's history were reviewed and   updated as appropriate: allergies, current medications, past family history, past medical history, past social history, past surgical history, and problem list.    Compared to one year ago, the patient feels her physical   health is the same.    Compared to one year ago, the patient feels her mental   health is the same.    Recent Hospitalizations:  She was not admitted to the hospital during the last year.       Current Medical Providers:  Patient Care Team:  Head, ESTHER Oliveira as PCP - General (Nurse Practitioner)  Brian Clayton MD as Consulting Physician (Gastroenterology)  Simi Alvares MD (Pain Medicine)    Outpatient Medications Prior to Visit   Medication Sig Dispense Refill   • aspirin-sod bicarb-citric acid ([place]) 325 MG effervescent tablet      • glucose blood test strip Use as instructed.  Check blood sugar daily. 100 each 11   • multivitamin (THERAGRAN) tablet tablet Take  by mouth Daily.     • metFORMIN (GLUCOPHAGE) 500 MG tablet Take 1 tablet by mouth 2 (Two) Times a Day With Meals. 180 tablet 1     No facility-administered medications prior to visit.       No opioid medication identified on active medication list. I have reviewed chart for other potential  high risk medication/s and harmful drug interactions in the elderly.        Aspirin is on active medication list. Aspirin use is indicated based on review of current medical condition/s. Pros and cons of this therapy have been discussed today. Benefits of this medication outweigh potential harm.  Patient has been encouraged to continue taking this medication.  .      Patient Active Problem List   Diagnosis   • Diabetes mellitus   • History of breast cancer   • Mixed hyperlipidemia   •  "Encounter for screening for malignant neoplasm of colon     Advance Care Planning   Advance Care Planning     Advance Directive is not on file.  ACP discussion was held with the patient during this visit. Patient does not have an advance directive, information provided.     Objective    Vitals:    24 0913   BP: 120/80   BP Location: Left arm   Patient Position: Sitting   Cuff Size: Adult   Pulse: 93   Temp: 98 °F (36.7 °C)   SpO2: 100%   Weight: 79.4 kg (175 lb)   Height: 165.1 cm (65\")     Estimated body mass index is 29.12 kg/m² as calculated from the following:    Height as of this encounter: 165.1 cm (65\").    Weight as of this encounter: 79.4 kg (175 lb).    BMI is >= 25 and <30. (Overweight) The following options were offered after discussion;: exercise counseling/recommendations, nutrition counseling/recommendations, and information on healthy weight added to patient's after visit summary       Does the patient have evidence of cognitive impairment? No    Lab Results   Component Value Date    CHLPL 278 (H) 2024    TRIG 160 (H) 2024    HDL 48 2024     (H) 2024    VLDL 30 2024    HGBA1C 7.20 (H) 2024        HEALTH RISK ASSESSMENT    Smoking Status:  Social History     Tobacco Use   Smoking Status Former   • Current packs/day: 0.00   • Average packs/day: 0.3 packs/day for 3.0 years (0.8 ttl pk-yrs)   • Types: Cigarettes   • Start date:    • Quit date:    • Years since quittin.6   Smokeless Tobacco Never     Alcohol Consumption:  Social History     Substance and Sexual Activity   Alcohol Use None     Fall Risk Screen:    STEADI Fall Risk Assessment was completed, and patient is at LOW risk for falls.Assessment completed on:2024    Depression Screenin/30/2024     9:21 AM   PHQ-2/PHQ-9 Depression Screening   Little Interest or Pleasure in Doing Things 0-->not at all   Feeling Down, Depressed or Hopeless 0-->not at all   PHQ-9: Brief " Depression Severity Measure Score 0       Health Habits and Functional and Cognitive Screenin/30/2024     9:20 AM   Functional & Cognitive Status   Do you have difficulty preparing food and eating? No   Do you have difficulty bathing yourself, getting dressed or grooming yourself? No   Do you have difficulty using the toilet? No   Do you have difficulty moving around from place to place? No   Do you have trouble with steps or getting out of a bed or a chair? No   Current Diet Well Balanced Diet   Dental Exam Not up to date   Eye Exam Up to date   Exercise (times per week) 7 times per week   Current Exercises Include Walking   Do you need help using the phone?  No   Are you deaf or do you have serious difficulty hearing?  No   Do you need help to go to places out of walking distance? No   Do you need help shopping? No   Do you need help preparing meals?  No   Do you need help with housework?  No   Do you need help with laundry? No   Do you need help taking your medications? No   Do you need help managing money? No   Do you ever drive or ride in a car without wearing a seat belt? No   Have you felt unusual stress, anger or loneliness in the last month? No   Who do you live with? Spouse   If you need help, do you have trouble finding someone available to you? No   Have you been bothered in the last four weeks by sexual problems? No   Do you have difficulty concentrating, remembering or making decisions? No       Age-appropriate Screening Schedule:  Refer to the list below for future screening recommendations based on patient's age, sex and/or medical conditions. Orders for these recommended tests are listed in the plan section. The patient has been provided with a written plan.    Health Maintenance   Topic Date Due   • HEPATITIS C SCREENING  Never done   • DIABETIC FOOT EXAM  Never done   • BMI FOLLOWUP  2024   • INFLUENZA VACCINE  2024   • DXA SCAN  2024 (Originally 3/21/2013)   • ZOSTER  VACCINE (1 of 2) 08/30/2024 (Originally 2/6/2001)   • COVID-19 Vaccine (1 - 2023-24 season) 09/01/2024 (Originally 9/1/2023)   • Pneumococcal Vaccine 65+ (1 of 2 - PCV) 08/30/2025 (Originally 2/6/1956)   • TDAP/TD VACCINES (1 - Tdap) 08/30/2025 (Originally 2/6/1969)   • DIABETIC EYE EXAM  01/09/2025   • HEMOGLOBIN A1C  02/23/2025   • URINE MICROALBUMIN  02/26/2025   • LIPID PANEL  08/23/2025   • MAMMOGRAM  08/29/2025   • ANNUAL WELLNESS VISIT  08/30/2025   • COLORECTAL CANCER SCREENING  01/16/2029                  CMS Preventative Services Quick Reference  Risk Factors Identified During Encounter  Immunizations Discussed/Encouraged: Shingrix, COVID19, and RSV (Respiratory Syncytial Virus)  Dental Screening Recommended  Vision Screening Recommended  The above risks/problems have been discussed with the patient.  Pertinent information has been shared with the patient in the After Visit Summary.  An After Visit Summary and PPPS were made available to the patient.    Follow Up:   Next Medicare Wellness visit to be scheduled in 1 year.       Additional E&M Note during same encounter follows:  Patient has multiple medical problems which are significant and separately identifiable that require additional work above and beyond the Medicare Wellness Visit.      Chief Complaint  Medicare Wellness-subsequent and Shoulder Problem (Rt shoulder is lower and pushes outward, also collar bone on same side Is larger no pain )    Subjective        HPI  Mercedes Santos is also being seen today for continued management concerning type 2 diabetes and hyperlipidemia.  Diabetes:  Fasting blood sugars run around 130.    Recently started taking 2 Metformin 2 weeks ago.  Previously was only taking once a day.  Exercise:  Walking.     Trouble sleeping.  Will not go to sleep sometimes until 0300.  Mind racing.  Does not wish to start on any prescription medications.  Recently bought magnesium however has yet to start taking.    Hyperlipidemia:  "Has not wanted to be on statin medication.  Trying to manage with watching diet limiting cholesterol intake.       US Breast Left Limited (08/29/2023 14:11)    Mammo Diagnostic Digital Tomosynthesis Bilateral With CAD (08/29/2023 13:53)    ENDOSCOPY - COLON (01/16/2024)    DIABETIC EYE EXAM - SCAN - DM EYE EXAM, [facility] 1/9/24 (01/09/2024)    Objective   Vital Signs:  /80 (BP Location: Left arm, Patient Position: Sitting, Cuff Size: Adult)   Pulse 93   Temp 98 °F (36.7 °C)   Ht 165.1 cm (65\")   Wt 79.4 kg (175 lb)   SpO2 100%   BMI 29.12 kg/m²     Physical Exam  Vitals reviewed.   Constitutional:       General: She is not in acute distress.     Appearance: She is well-developed.   HENT:      Head: Normocephalic and atraumatic.      Right Ear: Tympanic membrane normal.      Left Ear: Tympanic membrane normal.   Eyes:      Pupils: Pupils are equal, round, and reactive to light.   Cardiovascular:      Rate and Rhythm: Normal rate and regular rhythm.      Heart sounds: Normal heart sounds. No murmur heard.  Pulmonary:      Effort: Pulmonary effort is normal.      Breath sounds: Normal breath sounds. No wheezing, rhonchi or rales.   Chest:      Comments: Mild protruding to right sternoclavicular joint area.  Right shoulder and right clavicle lower when compared to left.   Abdominal:      General: Bowel sounds are normal.      Palpations: Abdomen is soft.      Tenderness: There is no abdominal tenderness.   Musculoskeletal:         General: No tenderness. Normal range of motion.      Cervical back: Normal range of motion and neck supple.      Right lower leg: No edema.      Left lower leg: No edema.   Skin:     General: Skin is warm and dry.      Findings: No erythema or rash.   Neurological:      Mental Status: She is alert and oriented to person, place, and time.   Psychiatric:         Mood and Affect: Mood normal.         Behavior: Behavior normal.          The following data was reviewed by: Emili MORLYE" Head, APRN on 08/30/2024:  Common labs          2/26/2024    09:10 6/13/2024    10:01 8/23/2024    11:01   Common Labs   Glucose 157  161  161    BUN 13  17  17    Creatinine 1.10  0.96  0.92    Sodium 143  138  141    Potassium 4.3  4.5  4.3    Chloride 104  104  105    Calcium 9.3  9.6  9.5    Total Protein 6.4  6.6  6.7    Albumin 4.1  4.5  4.4    Total Bilirubin 0.4  0.6  0.6    Alkaline Phosphatase 106  90  95    AST (SGOT) 15  19  15    ALT (SGPT) 20  19  20    WBC 5.53   5.66    Hemoglobin 14.7   14.8    Hematocrit 45.0   44.1    Platelets 257   261    Total Cholesterol 259   278    Triglycerides 157   160    HDL Cholesterol 54   48    LDL Cholesterol  176   200    Hemoglobin A1C 7.30  7.70  7.20    Microalbumin, Urine 9.9          The 10-year ASCVD risk score (Matthew KIM, et al., 2019) is: 22.5%    Values used to calculate the score:      Age: 74 years      Sex: Female      Is Non- : No      Diabetic: Yes      Tobacco smoker: No      Systolic Blood Pressure: 120 mmHg      Is BP treated: No      HDL Cholesterol: 48 mg/dL      Total Cholesterol: 278 mg/dL          Summary:    Mercedes Santos has been doing well since she was last seen except for problems with sleep which she plans to start over-the-counter magnesium supplement.  Also recommend melatonin if needed.  Let us know if symptoms worsen.  Also complains of right shoulder lower than the left and protruding right clavicle.  She will notice popping sensation with certain range of motion activity.  Recommend physical therapy for strengthening and posture in which she agrees.  Reviewed recent labs along with patient.  Her hemoglobin A1c is actually improved from previous.  Currently 7.2 compared to 7.7 previously.  Recently started taking metformin as directed.  Will continue same.  Lipid panel has worsened over the past 6 months.  I have discussed her increased risk for heart attack and stroke especially with her history of type 2  diabetes.  Concerning due to continued elevated levels along with 10-year ASCVD risk score elevated at 22.5%.  She is still reluctant in starting statin medication due to adverse effects.  We then agreed on proceeding with CT cardiac calcium score for further evaluation.  Results of this testing will determine further recommendations.  In the meantime, and should continue to watch diet limiting cholesterol intake which she already does.  Her thyroid function test are now back to normal.  Her kidney function has improved since last check.  Blood pressure stable at 120/80.  No changes in medications at this time.  Will have her return in 6 months for next recheck appointment with fasting labs.  In the meantime, instructed contact us sooner for any problems or concerns.     Assessment and Plan   Diagnoses and all orders for this visit:    1. Medicare annual wellness visit, subsequent (Primary)    2. Type 2 diabetes mellitus with hyperglycemia, without long-term current use of insulin  -     metFORMIN (GLUCOPHAGE) 500 MG tablet; Take 1 tablet by mouth 2 (Two) Times a Day With Meals.  Dispense: 180 tablet; Refill: 1  -     CT Cardiac Calcium Score Without Dye; Future  -     CBC (No Diff); Future  -     Comprehensive Metabolic Panel; Future  -     Lipid Panel With / Chol / HDL Ratio; Future  -     TSH Rfx On Abnormal To Free T4; Future  -     UA / M With / Rflx Culture([facility] ONLY) - Urine, Clean Catch; Future  -     Microalbumin / Creatinine Urine Ratio - Urine, Clean Catch; Future  -     Hemoglobin A1c; Future    3. Mixed hyperlipidemia  -     CT Cardiac Calcium Score Without Dye; Future  -     CBC (No Diff); Future  -     Comprehensive Metabolic Panel; Future  -     Lipid Panel With / Chol / HDL Ratio; Future  -     TSH Rfx On Abnormal To Free T4; Future  -     UA / M With / Rflx Culture([facility] ONLY) - Urine, Clean Catch; Future    4. Encounter for screening for coronary artery disease  -     CT Cardiac Calcium Score  Without Dye; Future    5. Posture abnormality  -     Ambulatory Referral to Physical Therapy for Evaluation & Treatment    6. Decreased strength of upper extremity  -     Ambulatory Referral to Physical Therapy for Evaluation & Treatment             Follow Up   Return in about 6 months (around 2/28/2025) for Recheck on DM with fasting labs week before.  .  Patient was given instructions and counseling regarding her condition or for health maintenance advice. Please see specific information pulled into the AVS if appropriate.

## 2024-09-10 ENCOUNTER — TREATMENT (OUTPATIENT)
Dept: PHYSICAL THERAPY | Facility: CLINIC | Age: 74
End: 2024-09-10
Payer: MEDICARE

## 2024-09-10 DIAGNOSIS — M53.82 DECREASED ROM OF INTERVERTEBRAL DISCS OF CERVICAL SPINE: ICD-10-CM

## 2024-09-10 DIAGNOSIS — M62.89 MUSCLE TIGHTNESS: ICD-10-CM

## 2024-09-10 DIAGNOSIS — R29.3 POSTURE IMBALANCE: Primary | ICD-10-CM

## 2024-09-10 PROCEDURE — 97110 THERAPEUTIC EXERCISES: CPT | Performed by: PHYSICAL THERAPIST

## 2024-09-10 PROCEDURE — 97161 PT EVAL LOW COMPLEX 20 MIN: CPT | Performed by: PHYSICAL THERAPIST

## 2024-09-10 PROCEDURE — 97530 THERAPEUTIC ACTIVITIES: CPT | Performed by: PHYSICAL THERAPIST

## 2024-09-10 NOTE — PROGRESS NOTES
Physical Therapy Initial Evaluation and Plan of Care    [address] [facility] [address]      Patient: Mercedes Santos   : 1950  Diagnosis/ICD-10 Code:  Posture imbalance [R29.3]  Referring practitioner: ESTHER Bass  Date of Initial Visit: 9/10/2024  Today's Date: 9/10/2024  Patient seen for 1 sessions           Subjective Questionnaire: QuickDASH: 13.64      Subjective Evaluation    History of Present Illness  Mechanism of injury: Pt reports she has noted R SC joint is swollen and R shoulder girdle is lower on the R side. No pain, just tightness in my muscles. Pt is R handed. Pt hoping to catch any issues prior to onset of pain.     Hobbies: computer work      Patient Occupation: retired- Quality of life: excellent    Pain  Current pain ratin    Hand dominance: right    Diagnostic Tests  No diagnostic tests performed    Patient Goals  Patient goals for therapy: increased motion, increased strength, independence with ADLs/IADLs and return to sport/leisure activities           Past Medical History:   Diagnosis Date    Breast cancer 2016    Diabetes mellitus       Past Surgical History:   Procedure Laterality Date    COLONOSCOPY N/A 2024    Procedure: COLONOSCOPY TO CECUM;  Surgeon: Brian Clayton MD;  Location: [facility] MAIN OR;  Service: Gastroenterology;  Laterality: N/A;  ADEQUATE PREP, POLYPS,DIVERTICULOSIS, HEMORRHOIDS    MASTECTOMY PARTIAL / LUMPECTOMY W/ AXILLARY LYMPHADENECTOMY Right 2016          Objective          Static Posture     Shoulders  Depressed.    Comments  R shoulder lower than L, R SC joint swollen, spine in alignment, no scoliosis    Postural Observations  Seated posture: fair      Palpation   Left   Hypertonic in the pectoralis major, pectoralis minor, thoracic paraspinals and upper trapezius.   Tenderness of the thoracic paraspinals.     Right   Hypertonic in the cervical paraspinals, infraspinatus, pectoralis major, pectoralis minor,  scalenes, subscapularis, supraspinatus, thoracic paraspinals and upper trapezius. Tenderness of the cervical paraspinals, pectoralis major, pectoralis minor, scalenes and subscapularis.     Cervical Spine Comments  Right cervical paraspinals: C3-C5.     Tenderness     Right Shoulder  Tenderness in the clavicle and SC joint.     Neurological Testing     Sensation     Shoulder   Left Shoulder   Intact: light touch    Right Shoulder   Intact: Light touch    Additional Neurological Details  Pt denies numbness and tingling down RUE; does report intermittent on LUE    Active Range of Motion   Cervical/Thoracic Spine   Cervical    Left lateral flexion: 12 degrees   Right lateral flexion: 18 degrees   Left rotation: 67 degrees   Right rotation: 60 degrees     Right Shoulder   Flexion: 165 degrees   Abduction: 165 degrees   External rotation 0°: 65 degrees   External rotation BTH: C7   Internal rotation BTB: mid thoracic     Scapular Mobility   Left Shoulder   Scapular mobility: fair  Scapular Mobility with Shoulder to 90° FF   Scapular winging: minimal    Right Shoulder   Scapular mobility: fair  Scapular Mobility with Shoulder to 90° FF   Scapular winging: minimal    Strength/Myotome Testing     Left Shoulder     Planes of Motion   Flexion: 4   Abduction: 4-   External rotation at 0°: 4+   Internal rotation at 0°: 5     Right Shoulder     Planes of Motion   Flexion: 4   Abduction: 4-   External rotation at 0°: 4+   Internal rotation at 0°: 5     Tests   Cervical     Left   Negative alar ligament integrity.     Right   Negative alar ligament integrity.     Right Shoulder   Positive SC joint stress.   Negative belly press, empty can, full can, Hawkin's and Neer's.     Additional Tests Details  Negative VA testing B          Assessment & Plan       Assessment  Impairments: abnormal or restricted ROM, activity intolerance, impaired physical strength and lacks appropriate home exercise program   Functional limitations: carrying  objects, lifting, sitting, standing, reaching overhead and unable to perform repetitive tasks   Assessment details: Mercedes Santos is a 74 y.o. year-old female referred to physical therapy for noted SC joint swelling and rounded shoulders with asymmetrical posture . She presents with a stable clinical presentation.  She has comorbidities R sided breast cancer and personal factors of being R handed that may affect her progress in the plan of care. Pt with pec and scalene tightness and tenderness along with scapular/postural muscle weakness. Patient is appropriate for skilled physical therapy in order to reduce pain and increase ease with daily mobility. During evaluation, pt educated on anatomy, goal of interventions, posture and body mechanics to promote healthy lifestyle and improve quality of life.    Prognosis: good    Goals  Plan Goals: Plan Goals: STG 4 weeks  1.  Decrease tightness and tenderness of UT, SCM and scalenes to mild by palpation.  2.  Increase cervical AROM in rotation to 70 degrees B  3.  Increase cervical AROM in lateral flexion to 25 degrees B      LTG 12 weeks  1.  Decrease tenderness of UT, SCM and scalenes to minimal to none by palpation.  2.  Increase B shoulder flexion and ER strength to 5/5.  3.  Pt will presents with symmetrical posture sitting and standing at shoulder level      Plan  Therapy options: will be seen for skilled therapy services  Planned modality interventions: cryotherapy, thermotherapy (hydrocollator packs), TENS, iontophoresis, traction, ultrasound and dry needling  Planned therapy interventions: abdominal trunk stabilization, balance/weight-bearing training, ADL retraining, body mechanics training, flexibility, functional ROM exercises, home exercise program, IADL retraining, joint mobilization, manual therapy, neuromuscular re-education, postural training, soft tissue mobilization, spinal/joint mobilization, strengthening, stretching, therapeutic activities and  transfer training  Frequency: 1x week  Treatment plan discussed with: patient  Plan details: 1x per week for 12 weeks        Visit Diagnoses:    ICD-10-CM ICD-9-CM   1. Posture imbalance  R29.3 729.90   2. Decreased ROM of intervertebral discs of cervical spine  M53.82 724.9   3. Muscle tightness  M62.89 728.9       Timed:  Manual Therapy:    -     mins  33621;  Therapeutic Exercise:    20     mins  87095;     Neuromuscular Meeta:    -    mins  76658;    Therapeutic Activity:     10     mins  92249;     Gait Training:      -     mins  19908;     Ultrasound:     -     mins  70972;    Electrical Stimulation:    -     mins  21244 ( );    Low Eval                       20      Mins  31153  Mod Eval                        -     Mins  89795  High Eval                       -     Mins  19569    Untimed:  Electrical Stimulation:    -     mins  78101 ( );  Mechanical Traction:    -     mins  24447;     Timed Treatment:   30   mins   Total Treatment:     50   mins    PT SIGNATURE: GINI Morel license: [id number]  DATE TREATMENT INITIATED: 9/10/2024    Initial Certification  Certification Period: 12/9/2024  I certify that the therapy services are furnished while this patient is under my care.  The services outlined above are required by this patient, and will be reviewed every 90 days.     PHYSICIAN: Head, ESTHER Oliveira      DATE:     Please sign and return via fax to [phone]. Thank you, [facility].

## 2024-09-17 ENCOUNTER — TREATMENT (OUTPATIENT)
Dept: PHYSICAL THERAPY | Facility: CLINIC | Age: 74
End: 2024-09-17
Payer: MEDICARE

## 2024-09-17 DIAGNOSIS — M62.89 MUSCLE TIGHTNESS: ICD-10-CM

## 2024-09-17 DIAGNOSIS — M53.82 DECREASED ROM OF INTERVERTEBRAL DISCS OF CERVICAL SPINE: ICD-10-CM

## 2024-09-17 DIAGNOSIS — R29.3 POSTURE IMBALANCE: Primary | ICD-10-CM

## 2024-09-24 ENCOUNTER — TREATMENT (OUTPATIENT)
Dept: PHYSICAL THERAPY | Facility: CLINIC | Age: 74
End: 2024-09-24
Payer: MEDICARE

## 2024-09-24 DIAGNOSIS — M62.89 MUSCLE TIGHTNESS: ICD-10-CM

## 2024-09-24 DIAGNOSIS — M53.82 DECREASED ROM OF INTERVERTEBRAL DISCS OF CERVICAL SPINE: ICD-10-CM

## 2024-09-24 DIAGNOSIS — R29.3 POSTURE IMBALANCE: Primary | ICD-10-CM

## 2024-09-24 PROCEDURE — 97140 MANUAL THERAPY 1/> REGIONS: CPT | Performed by: PHYSICAL THERAPIST

## 2024-09-24 PROCEDURE — 97035 APP MDLTY 1+ULTRASOUND EA 15: CPT | Performed by: PHYSICAL THERAPIST

## 2024-10-01 ENCOUNTER — TREATMENT (OUTPATIENT)
Dept: PHYSICAL THERAPY | Facility: CLINIC | Age: 74
End: 2024-10-01
Payer: MEDICARE

## 2024-10-01 DIAGNOSIS — M62.89 MUSCLE TIGHTNESS: ICD-10-CM

## 2024-10-01 DIAGNOSIS — R29.3 POSTURE IMBALANCE: Primary | ICD-10-CM

## 2024-10-01 DIAGNOSIS — M53.82 DECREASED ROM OF INTERVERTEBRAL DISCS OF CERVICAL SPINE: ICD-10-CM

## 2024-10-01 NOTE — PROGRESS NOTES
Physical Therapy Daily Treatment Note      Patient: Mercedes Santos   : 1950  Referring practitioner: ESTHER Bass  Date of Initial Visit: Type: THERAPY  Noted: 9/10/2024  Today's Date: 10/1/2024  Patient seen for 4 sessions         Mercedes Santos reports: posture is improving              Objective   See Exercise, Manual, and Modality Logs for complete treatment.       Assessment/Plan  Pt reports more symmetrical posture and decreased neck pain; has been sleeping with one less pillow which seems to be helping. Added shoulder flexion with cane and increased reps of scapular strengthening today.        Progress per Plan of Care and Progress strengthening /stabilization /functional activity           Timed:  Manual Therapy:    15     mins  17888;  Therapeutic Exercise:    20     mins  39023;     Neuromuscular Meeta:    -    mins  75684;    Therapeutic Activity:     -     mins  38306;     Gait Training:      -     mins  84414;     Ultrasound:     8     mins  54515;      Untimed:  Electrical Stimulation:    -     mins  73139 ( );  Mechanical Traction:    -     mins  34346;   Dry needling:      -     mins  38996/72428    Timed Treatment:   43   mins   Total Treatment:     55   mins  Tania Silveira PT  Physical Therapist    License #: [id number]

## 2024-10-21 NOTE — PROGRESS NOTES
Patient ID: Mercedes Santos is a 74 y.o. female     Patient Care Team:  Head, ESTHER Oliveira as PCP - General (Nurse Practitioner)  Brian Clayton MD as Consulting Physician (Gastroenterology)  Simi Alvares MD (Pain Medicine)    Subjective     Chief Complaint   Patient presents with    Skin Lesion     UC follow up- spot on back and now on stomach. Is taking doxycycline, spots are not much better.       History of Present Illness    Mercedes Santos presents to [facility] today for follow-up after being evaluated in the urgent care due to rash on the right side of back.  Rash has been ongoing for approximately 5 days prior to being seen on October 17, 2024.  States the rash was painful and blistery.  Unknown cause she was started on doxycycline.  Rash seems to be improving.  Still has redness to area.  Does admit to increased stressors prior to the outbreak of rash.  She also was concerned due to intermittent RUQ pain.  None currently.  No N/V.  Had Fibroscan.  Completed in 2017 which showed elevated levels of 0.35 indicating stage F1 - F2 fibrosis.  This was completed after her treatment for breast cancer.    She denies any complaints of fever, chills, cough, chest pain, shortness of air,  nausea, or any other concerns.     Urgent Care Provider Note by Elizabeth Morales APRN (10/17/2024 12:07)    The following portions of the patient's history were reviewed and updated as appropriate: allergies, current medications, past family history, past medical history, past social history, past surgical history and problem list.       ROS    Vitals:    10/22/24 1354   BP: 120/78   Pulse: 81   Temp: 98 °F (36.7 °C)   SpO2: 99%       Documented weights    10/22/24 1354   Weight: 79.4 kg (175 lb)     Body mass index is 29.12 kg/m².    Results for orders placed or performed in visit on 08/25/24   CBC (No Diff)    Collection Time: 08/23/24 11:01 AM    Specimen: Blood   Result Value Ref Range     WBC 5.66 3.40 - 10.80 10*3/mm3    RBC 4.73 3.77 - 5.28 10*6/mm3    Hemoglobin 14.8 12.0 - 15.9 g/dL    Hematocrit 44.1 34.0 - 46.6 %    MCV 93.2 79.0 - 97.0 fL    MCH 31.3 26.6 - 33.0 pg    MCHC 33.6 31.5 - 35.7 g/dL    RDW 13.0 12.3 - 15.4 %    Platelets 261 140 - 450 10*3/mm3   Comprehensive Metabolic Panel    Collection Time: 08/23/24 11:01 AM    Specimen: Blood   Result Value Ref Range    Glucose 161 (H) 65 - 99 mg/dL    BUN 17 8 - 23 mg/dL    Creatinine 0.92 0.57 - 1.00 mg/dL    EGFR Result 65.9 >60.0 mL/min/1.73    BUN/Creatinine Ratio 18.5 7.0 - 25.0    Sodium 141 136 - 145 mmol/L    Potassium 4.3 3.5 - 5.2 mmol/L    Chloride 105 98 - 107 mmol/L    Total CO2 21.8 (L) 22.0 - 29.0 mmol/L    Calcium 9.5 8.6 - 10.5 mg/dL    Total Protein 6.7 6.0 - 8.5 g/dL    Albumin 4.4 3.5 - 5.2 g/dL    Globulin 2.3 gm/dL    A/G Ratio 1.9 g/dL    Total Bilirubin 0.6 0.0 - 1.2 mg/dL    Alkaline Phosphatase 95 39 - 117 U/L    AST (SGOT) 15 1 - 32 U/L    ALT (SGPT) 20 1 - 33 U/L   Lipid Panel With / Chol / HDL Ratio    Collection Time: 08/23/24 11:01 AM    Specimen: Blood   Result Value Ref Range    Total Cholesterol 278 (H) 0 - 200 mg/dL    Triglycerides 160 (H) 0 - 150 mg/dL    HDL Cholesterol 48 40 - 60 mg/dL    VLDL Cholesterol Federico 30 5 - 40 mg/dL    LDL Chol Calc (NIH) 200 (H) 0 - 100 mg/dL    Chol/HDL Ratio 5.79    TSH Rfx On Abnormal To Free T4    Collection Time: 08/23/24 11:01 AM    Specimen: Blood   Result Value Ref Range    TSH 4.020 0.270 - 4.200 uIU/mL   UA / M With / Rflx Culture([facility] ONLY) - Urine, Clean Catch    Collection Time: 08/23/24 11:01 AM    Specimen: Urine, Clean Catch   Result Value Ref Range    Specific Gravity, UA 1.015 1.005 - 1.030    pH, UA 5.5 5.0 - 7.5    Color, UA Yellow Yellow    Appearance, UA Clear Clear    Leukocytes, UA 2+ (A) Negative    Protein Negative Negative/Trace    Glucose, UA Negative Negative    Ketones Negative Negative    Blood, UA Negative Negative    Bilirubin, UA Negative  Negative    Urobilinogen, UA 0.2 0.2 - 1.0 mg/dL    Nitrite, UA Negative Negative    Microscopic Examination See below:     Urinalysis Reflex Comment    Hemoglobin A1c    Collection Time: 08/23/24 11:01 AM    Specimen: Blood   Result Value Ref Range    Hemoglobin A1C 7.20 (H) 4.80 - 5.60 %   Microscopic Examination -    Collection Time: 08/23/24 11:01 AM   Result Value Ref Range    WBC, UA 11-30 (A) 0 - 5 /hpf    RBC, UA None seen 0 - 2 /hpf    Epithelial Cells (non renal) 0-10 0 - 10 /hpf    Casts None seen None seen /lpf    Bacteria, UA Few None seen/Few   Urine Culture, Routine -    Collection Time: 08/23/24 11:01 AM   Result Value Ref Range    Urine Culture Final report     Result 1 No growth            Objective     Physical Exam  Vitals reviewed.   Constitutional:       General: She is not in acute distress.  Cardiovascular:      Rate and Rhythm: Normal rate.   Pulmonary:      Effort: Pulmonary effort is normal.   Abdominal:      General: There is no distension.      Palpations: Abdomen is soft.      Tenderness: There is no abdominal tenderness.   Skin:     Comments: Erythema rash noted to right flank area which appears to be scabbing over.  Likely was related to use shingles.   Neurological:      Mental Status: She is alert.            Assessment & Plan     Assessment/Plan     Diagnoses and all orders for this visit:    1. Dermatitis (Primary)  -     triamcinolone (KENALOG) 0.1 % cream; Apply 1 Application topically to the appropriate area as directed 2 (Two) Times a Day.  Dispense: 28.4 g; Refill: 0   Appears rash was likely related to shingles especially due to the amount of discomfort she was having.  Also was having increased stressors prior to onset of rash.  Will continue the doxycycline as directed until gone since she has noticed improvement since then.  Starting Valtrex would not be beneficial since rash has been present for over 10 days.  Also reassuring the pain has resolved at location of  rash.    2. Right upper quadrant abdominal pain   Intermittent.  Most recent liver function test are stable however she wishes to proceed with ultrasound of liver for further evaluation.      Follow Up:  Return if symptoms worsen or fail to improve, for Next scheduled follow up.    In the meantime, instructed to contact us sooner for any problems or concerns.    Patient was given instructions and counseling regarding condition or for health maintenance advice.  Please see specific information pulled into the AVS if appropriate.          Emili Iraheta, APRN  Family Medicine  [facility]  10/22/24  14:18 EDT

## 2024-10-22 ENCOUNTER — OFFICE VISIT (OUTPATIENT)
Dept: FAMILY MEDICINE CLINIC | Facility: CLINIC | Age: 74
End: 2024-10-22
Payer: MEDICARE

## 2024-10-22 VITALS
DIASTOLIC BLOOD PRESSURE: 78 MMHG | SYSTOLIC BLOOD PRESSURE: 120 MMHG | HEIGHT: 65 IN | OXYGEN SATURATION: 99 % | HEART RATE: 81 BPM | BODY MASS INDEX: 29.16 KG/M2 | WEIGHT: 175 LBS | TEMPERATURE: 98 F

## 2024-10-22 DIAGNOSIS — R10.11 RIGHT UPPER QUADRANT ABDOMINAL PAIN: ICD-10-CM

## 2024-10-22 DIAGNOSIS — L30.9 DERMATITIS: Primary | ICD-10-CM

## 2024-10-22 PROCEDURE — 99213 OFFICE O/P EST LOW 20 MIN: CPT | Performed by: NURSE PRACTITIONER

## 2024-10-22 PROCEDURE — 3051F HG A1C>EQUAL 7.0%<8.0%: CPT | Performed by: NURSE PRACTITIONER

## 2024-10-22 PROCEDURE — 1159F MED LIST DOCD IN RCRD: CPT | Performed by: NURSE PRACTITIONER

## 2024-10-22 PROCEDURE — 1160F RVW MEDS BY RX/DR IN RCRD: CPT | Performed by: NURSE PRACTITIONER

## 2024-10-22 RX ORDER — TRIAMCINOLONE ACETONIDE 1 MG/G
1 CREAM TOPICAL 2 TIMES DAILY
Qty: 28.4 G | Refills: 0 | Status: SHIPPED | OUTPATIENT
Start: 2024-10-22

## 2024-11-01 ENCOUNTER — HOSPITAL ENCOUNTER (OUTPATIENT)
Dept: ULTRASOUND IMAGING | Facility: HOSPITAL | Age: 74
Discharge: HOME | End: 2024-11-01
Payer: MEDICARE

## 2024-11-01 DIAGNOSIS — R10.11 RIGHT UPPER QUADRANT ABDOMINAL PAIN: ICD-10-CM

## 2024-11-01 PROCEDURE — 76705 ECHO EXAM OF ABDOMEN: CPT

## 2024-12-02 ENCOUNTER — HOSPITAL ENCOUNTER (OUTPATIENT)
Dept: HOSPITAL 47 - RADMAMWWP | Age: 74
Discharge: HOME | End: 2024-12-02
Attending: FAMILY MEDICINE
Payer: MEDICARE

## 2024-12-02 DIAGNOSIS — R92.323: ICD-10-CM

## 2024-12-02 DIAGNOSIS — Z12.31: Primary | ICD-10-CM

## 2024-12-02 PROCEDURE — 77067 SCR MAMMO BI INCL CAD: CPT

## 2025-02-20 DIAGNOSIS — E78.2 MIXED HYPERLIPIDEMIA: ICD-10-CM

## 2025-02-20 DIAGNOSIS — E11.65 TYPE 2 DIABETES MELLITUS WITH HYPERGLYCEMIA, WITHOUT LONG-TERM CURRENT USE OF INSULIN: ICD-10-CM

## 2025-02-21 ENCOUNTER — LAB (OUTPATIENT)
Dept: LAB | Facility: HOSPITAL | Age: 75
End: 2025-02-21
Payer: MEDICARE

## 2025-02-21 LAB
ALBUMIN SERPL-MCNC: 4.2 G/DL (ref 3.5–5.2)
ALBUMIN UR-MCNC: <1.2 MG/DL
ALBUMIN/GLOB SERPL: 1.6 G/DL
ALP SERPL-CCNC: 98 U/L (ref 39–117)
ALT SERPL W P-5'-P-CCNC: 11 U/L (ref 1–33)
ANION GAP SERPL CALCULATED.3IONS-SCNC: 11 MMOL/L (ref 5–15)
AST SERPL-CCNC: 11 U/L (ref 1–32)
BILIRUB SERPL-MCNC: 0.5 MG/DL (ref 0–1.2)
BUN SERPL-MCNC: 17 MG/DL (ref 8–23)
BUN/CREAT SERPL: 20.2 (ref 7–25)
CALCIUM SPEC-SCNC: 9.4 MG/DL (ref 8.6–10.5)
CHLORIDE SERPL-SCNC: 105 MMOL/L (ref 98–107)
CHOLEST SERPL-MCNC: 286 MG/DL (ref 0–200)
CO2 SERPL-SCNC: 24 MMOL/L (ref 22–29)
CREAT SERPL-MCNC: 0.84 MG/DL (ref 0.57–1)
CREAT UR-MCNC: 100.4 MG/DL
DEPRECATED RDW RBC AUTO: 42.4 FL (ref 37–54)
EGFRCR SERPLBLD CKD-EPI 2021: 73 ML/MIN/1.73
ERYTHROCYTE [DISTWIDTH] IN BLOOD BY AUTOMATED COUNT: 12.8 % (ref 12.3–15.4)
GLOBULIN UR ELPH-MCNC: 2.7 GM/DL
GLUCOSE SERPL-MCNC: 153 MG/DL (ref 65–99)
HBA1C MFR BLD: 7.1 % (ref 4.8–5.6)
HCT VFR BLD AUTO: 44.7 % (ref 34–46.6)
HDLC SERPL QL: 5.61
HDLC SERPL-MCNC: 51 MG/DL (ref 40–60)
HGB BLD-MCNC: 14.9 G/DL (ref 12–15.9)
LDLC SERPL CALC-MCNC: 210 MG/DL (ref 0–100)
MCH RBC QN AUTO: 30.4 PG (ref 26.6–33)
MCHC RBC AUTO-ENTMCNC: 33.3 G/DL (ref 31.5–35.7)
MCV RBC AUTO: 91.2 FL (ref 79–97)
MICROALBUMIN/CREAT UR: NORMAL MG/G{CREAT}
PLATELET # BLD AUTO: 268 10*3/MM3 (ref 140–450)
PMV BLD AUTO: 10.4 FL (ref 6–12)
POTASSIUM SERPL-SCNC: 4.6 MMOL/L (ref 3.5–5.2)
PROT SERPL-MCNC: 6.9 G/DL (ref 6–8.5)
RBC # BLD AUTO: 4.9 10*6/MM3 (ref 3.77–5.28)
SODIUM SERPL-SCNC: 140 MMOL/L (ref 136–145)
TRIGL SERPL-MCNC: 134 MG/DL (ref 0–150)
TSH SERPL DL<=0.05 MIU/L-ACNC: 3.55 UIU/ML (ref 0.27–4.2)
VLDLC SERPL-MCNC: 25 MG/DL (ref 5–40)
WBC NRBC COR # BLD AUTO: 4.8 10*3/MM3 (ref 3.4–10.8)

## 2025-02-21 PROCEDURE — 84443 ASSAY THYROID STIM HORMONE: CPT | Performed by: NURSE PRACTITIONER

## 2025-02-21 PROCEDURE — 82043 UR ALBUMIN QUANTITATIVE: CPT | Performed by: NURSE PRACTITIONER

## 2025-02-21 PROCEDURE — 83036 HEMOGLOBIN GLYCOSYLATED A1C: CPT | Performed by: NURSE PRACTITIONER

## 2025-02-21 PROCEDURE — 80061 LIPID PANEL: CPT | Performed by: NURSE PRACTITIONER

## 2025-02-21 PROCEDURE — 85027 COMPLETE CBC AUTOMATED: CPT | Performed by: NURSE PRACTITIONER

## 2025-02-21 PROCEDURE — 81003 URINALYSIS AUTO W/O SCOPE: CPT | Performed by: NURSE PRACTITIONER

## 2025-02-21 PROCEDURE — 80053 COMPREHEN METABOLIC PANEL: CPT | Performed by: NURSE PRACTITIONER

## 2025-02-21 PROCEDURE — 87086 URINE CULTURE/COLONY COUNT: CPT | Performed by: NURSE PRACTITIONER

## 2025-02-21 PROCEDURE — 82570 ASSAY OF URINE CREATININE: CPT | Performed by: NURSE PRACTITIONER

## 2025-02-21 PROCEDURE — 36415 COLL VENOUS BLD VENIPUNCTURE: CPT

## 2025-02-24 LAB
APPEARANCE UR: CLEAR
BACTERIA #/AREA URNS HPF: ABNORMAL /[HPF]
BACTERIA UR CULT: NORMAL
BACTERIA UR CULT: NORMAL
BILIRUB UR QL STRIP: NEGATIVE
CASTS URNS QL MICRO: ABNORMAL /LPF
COLOR UR: YELLOW
EPI CELLS #/AREA URNS HPF: >10 /HPF (ref 0–10)
GLUCOSE UR QL STRIP: NEGATIVE
HGB UR QL STRIP: NEGATIVE
KETONES UR QL STRIP: NEGATIVE
LEUKOCYTE ESTERASE UR QL STRIP: ABNORMAL
MICRO URNS: ABNORMAL
NITRITE UR QL STRIP: NEGATIVE
PH UR STRIP: 5 [PH] (ref 5–7.5)
PROT UR QL STRIP: NEGATIVE
RBC #/AREA URNS HPF: ABNORMAL /HPF (ref 0–2)
SP GR UR STRIP: 1.01 (ref 1–1.03)
URINALYSIS REFLEX: ABNORMAL
UROBILINOGEN UR STRIP-MCNC: 0.2 MG/DL (ref 0.2–1)
WBC #/AREA URNS HPF: ABNORMAL /HPF (ref 0–5)

## 2025-02-28 ENCOUNTER — OFFICE VISIT (OUTPATIENT)
Dept: FAMILY MEDICINE CLINIC | Facility: CLINIC | Age: 75
End: 2025-02-28
Payer: MEDICARE

## 2025-02-28 VITALS
WEIGHT: 174 LBS | HEIGHT: 65 IN | DIASTOLIC BLOOD PRESSURE: 80 MMHG | OXYGEN SATURATION: 100 % | TEMPERATURE: 97.8 F | SYSTOLIC BLOOD PRESSURE: 120 MMHG | BODY MASS INDEX: 28.99 KG/M2 | HEART RATE: 91 BPM

## 2025-02-28 DIAGNOSIS — E78.2 MIXED HYPERLIPIDEMIA: ICD-10-CM

## 2025-02-28 DIAGNOSIS — E11.65 TYPE 2 DIABETES MELLITUS WITH HYPERGLYCEMIA, WITHOUT LONG-TERM CURRENT USE OF INSULIN: Primary | ICD-10-CM

## 2025-02-28 PROCEDURE — 3051F HG A1C>EQUAL 7.0%<8.0%: CPT | Performed by: NURSE PRACTITIONER

## 2025-02-28 PROCEDURE — 99214 OFFICE O/P EST MOD 30 MIN: CPT | Performed by: NURSE PRACTITIONER

## 2025-02-28 RX ORDER — ATORVASTATIN CALCIUM 20 MG/1
20 TABLET, FILM COATED ORAL DAILY
Qty: 30 TABLET | Refills: 0 | Status: SHIPPED | OUTPATIENT
Start: 2025-02-28

## 2025-02-28 RX ORDER — ATORVASTATIN CALCIUM 20 MG/1
20 TABLET, FILM COATED ORAL DAILY
Qty: 90 TABLET | OUTPATIENT
Start: 2025-02-28

## 2025-02-28 RX ORDER — ASPIRIN 81 MG/1
81 TABLET ORAL DAILY
COMMUNITY
Start: 2025-02-28

## 2025-02-28 NOTE — PROGRESS NOTES
Answers submitted by the patient for this visit:  Problem not listed (Submitted on 2/26/2025)  Chief Complaint: Other medical problem  Other symptom: Routine after Blood Draw  Medications tried: n/a  Additional information: n/a  Patient ID: Mercedes Santos is a 75 y.o. female     Patient Care Team:  Head, ESTHER Oliveira as PCP - General (Nurse Practitioner)  Brian Clayton MD as Consulting Physician (Gastroenterology)  Simi Alvares MD (Pain Medicine)    Subjective     Chief Complaint   Patient presents with    Diabetes     Still having issues keeping blood sugar low.        History of Present Illness      History of Present Illness  The patient presents for a recheck and had some labs done.    She has been experiencing chest pain during cold weather, which she attributes to inhaling cold air. She does not have any chest pain today.    She continues to monitor her blood glucose levels at home. She identifies as a carbohydrate addict, often overeating when consuming carbs. She has recently resumed a carbohydrate-addict diet, allowing herself one reward meal per day, with the remainder of her meals being protein-based. She is also practicing intermittent fasting, with her reward meal typically consumed around 1:00 PM. She has expressed interest in adjusting her metformin dosage to two tablets at once, rather than one twice daily.    She reports difficulty swallowing pills, including aspirin, and occasionally experiences regurgitation. She has not been taking aspirin regularly but plans to resume it. She has a history of GERD, for which she was treated with omeprazole for 6 months to a year. During this treatment, she experienced abdominal distension and constipation for a period of 2 weeks. She was subsequently diagnosed with fatty liver disease, which she attributes to her sensitivity to medications. These symptoms resolved upon discontinuation of omeprazole.    She has a family history of diabetes and kidney  disease, with her sister, mother, and father all affected. She is particularly concerned about her renal health and wishes to focus on this during her upcoming blood tests. She reports no urinary symptoms such as dysuria or frequency.    She has a history of intolerance to many meds and is seeking dietary advice to manage her cholesterol levels. She is considering a restrictive diet as a potential solution.        Results  US Breast Left Limited (08/29/2023 14:11)    Mammo Diagnostic Digital Tomosynthesis Bilateral With CAD (08/29/2023 13:53)    CT Cardiac Calcium Score Without Dye (10/16/2024 16:01)      Laboratory Studies  CBC shows normal white count and hemoglobin. Urine test showed 2+ leukocytes, no protein spillage, presence of epithelial cells and mixed gladys. Glucose was 153. Kidney function is normal with BUN and creatinine levels within normal range, GFR greater than 60. Liver function tests (AST, ALT, alkaline phosphatase) are normal. Total cholesterol was 278, increased to 286, LDL was 200, increased to 210. Hemoglobin A1c decreased from 7.2 to 7.1. Thyroid function is normal.    Imaging  CT cardiac calcium score: Left main coronary artery score is zero, indicating no plaque buildup. Left anterior descending artery score is 268. Left circumflex artery score is zero. Right coronary ascending artery score is 147. Valves appear normal. Overall score is 415, indicating moderate to severely increased risk.       She denies any complaints of fever, chills, cough, chest pain, shortness of air, abdominal pain, nausea, or any other concerns.     The following portions of the patient's history were reviewed and updated as appropriate: allergies, current medications, past family history, past medical history, past social history, past surgical history and problem list.       ROS    Vitals:    02/28/25 1119   BP: 120/80   Pulse: 91   Temp: 97.8 °F (36.6 °C)   SpO2: 100%       Documented weights    02/28/25 1119    Weight: 78.9 kg (174 lb)     Body mass index is 28.96 kg/m².    Results for orders placed or performed in visit on 02/20/25   CBC (No Diff)    Collection Time: 02/21/25 11:00 AM    Specimen: Blood   Result Value Ref Range    WBC 4.80 3.40 - 10.80 10*3/mm3    RBC 4.90 3.77 - 5.28 10*6/mm3    Hemoglobin 14.9 12.0 - 15.9 g/dL    Hematocrit 44.7 34.0 - 46.6 %    MCV 91.2 79.0 - 97.0 fL    MCH 30.4 26.6 - 33.0 pg    MCHC 33.3 31.5 - 35.7 g/dL    RDW 12.8 12.3 - 15.4 %    RDW-SD 42.4 37.0 - 54.0 fl    MPV 10.4 6.0 - 12.0 fL    Platelets 268 140 - 450 10*3/mm3   Comprehensive Metabolic Panel    Collection Time: 02/21/25 11:00 AM    Specimen: Blood   Result Value Ref Range    Glucose 153 (H) 65 - 99 mg/dL    BUN 17 8 - 23 mg/dL    Creatinine 0.84 0.57 - 1.00 mg/dL    Sodium 140 136 - 145 mmol/L    Potassium 4.6 3.5 - 5.2 mmol/L    Chloride 105 98 - 107 mmol/L    CO2 24.0 22.0 - 29.0 mmol/L    Calcium 9.4 8.6 - 10.5 mg/dL    Total Protein 6.9 6.0 - 8.5 g/dL    Albumin 4.2 3.5 - 5.2 g/dL    ALT (SGPT) 11 1 - 33 U/L    AST (SGOT) 11 1 - 32 U/L    Alkaline Phosphatase 98 39 - 117 U/L    Total Bilirubin 0.5 0.0 - 1.2 mg/dL    Globulin 2.7 gm/dL    A/G Ratio 1.6 g/dL    BUN/Creatinine Ratio 20.2 7.0 - 25.0    Anion Gap 11.0 5.0 - 15.0 mmol/L    eGFR 73.0 >60.0 mL/min/1.73   Lipid Panel With / Chol / HDL Ratio    Collection Time: 02/21/25 11:00 AM    Specimen: Blood   Result Value Ref Range    Total Cholesterol 286 (H) 0 - 200 mg/dL    Triglycerides 134 0 - 150 mg/dL    HDL Cholesterol 51 40 - 60 mg/dL    LDL Cholesterol  210 (H) 0 - 100 mg/dL    VLDL Cholesterol 25 5 - 40 mg/dL    Chol/HDL Ratio 5.61    TSH Rfx On Abnormal To Free T4    Collection Time: 02/21/25 11:00 AM    Specimen: Blood   Result Value Ref Range    TSH 3.550 0.270 - 4.200 uIU/mL   Hemoglobin A1c    Collection Time: 02/21/25 11:00 AM    Specimen: Blood   Result Value Ref Range    Hemoglobin A1C 7.10 (H) 4.80 - 5.60 %   UA / M With / Rflx Culture([facility] ONLY)  - Urine, Clean Catch    Collection Time: 02/21/25 11:04 AM    Specimen: Urine, Clean Catch   Result Value Ref Range    Specific Gravity, UA 1.015 1.005 - 1.030    pH, UA 5.0 5.0 - 7.5    Color, UA Yellow Yellow    Appearance, UA Clear Clear    Leukocytes, UA 2+ (A) Negative    Protein Negative Negative/Trace    Glucose, UA Negative Negative    Ketones Negative Negative    Blood, UA Negative Negative    Bilirubin, UA Negative Negative    Urobilinogen, UA 0.2 0.2 - 1.0 mg/dL    Nitrite, UA Negative Negative    Microscopic Examination See below:     Urinalysis Reflex Comment    Microalbumin / Creatinine Urine Ratio - Urine, Clean Catch    Collection Time: 02/21/25 11:04 AM    Specimen: Urine, Clean Catch   Result Value Ref Range    Microalbumin/Creatinine Ratio      Creatinine, Urine 100.4 mg/dL    Microalbumin, Urine <1.2 mg/dL   Microscopic Examination - Urine, Clean Catch    Collection Time: 02/21/25 11:04 AM    Specimen: Urine, Clean Catch   Result Value Ref Range    WBC, UA 11-30 (A) 0 - 5 /hpf    RBC, UA None seen 0 - 2 /hpf    Epithelial Cells (non renal) >10 (A) 0 - 10 /hpf    Casts None seen None seen /lpf    Bacteria, UA Few None seen/Few   Urine Culture, Routine - Urine, Clean Catch    Collection Time: 02/21/25 11:04 AM    Specimen: Urine, Clean Catch   Result Value Ref Range    Urine Culture Final report     Result 1 Comment            Objective     Physical Exam  Vitals reviewed.   Constitutional:       General: She is not in acute distress.  HENT:      Head: Normocephalic and atraumatic.      Right Ear: Tympanic membrane normal.      Left Ear: Tympanic membrane normal.      Mouth/Throat:      Pharynx: No posterior oropharyngeal erythema.   Cardiovascular:      Rate and Rhythm: Normal rate and regular rhythm.      Heart sounds: No murmur heard.  Pulmonary:      Effort: Pulmonary effort is normal.      Breath sounds: Normal breath sounds. No wheezing or rhonchi.   Abdominal:      Palpations: Abdomen is  soft.   Musculoskeletal:      Cervical back: Normal range of motion.      Right lower leg: No edema.      Left lower leg: No edema.   Neurological:      General: No focal deficit present.      Mental Status: She is alert.         Physical Exam      Assessment & Plan     Assessment/Plan     Assessment & Plan  1. Chest discomfort.  The chest discomfort is likely due to exposure to cold weather, which can cause constriction and tension in the chest muscles. She has been advised to monitor her symptoms and report any recurrence of chest discomfort, particularly in warm weather.    2. Diabetes Mellitus.  Her hemoglobin A1c level has decreased from 7.2 to 7.1, indicating improved glycemic control. She has been advised to continue monitoring her carbohydrate intake and maintain a balanced diet. She will continue her current regimen of metformin 500 mg twice daily. If she prefers, she can take two tablets at once in the morning. She has been cautioned about potential gastrointestinal side effects, such as diarrhea, if the dosage is increased too quickly.    3. Dysphagia.  She reports difficulty swallowing pills, including aspirin. She has been advised to try taking aspirin with a spoonful of applesauce or yogurt to facilitate swallowing. A swallow study may be considered if symptoms persist.    4. Chronic Kidney Disease Screening.  Given her family history of chronic kidney disease, she has been advised to monitor her kidney function regularly. Her recent labs show normal kidney function, including BUN, creatinine, and GFR.    5. Hypercholesterolemia.  Her elevated cholesterol levels are likely due to familial hypercholesterolemia, as she is not significantly overweight and maintains a healthy diet. Her CT cardiac calcium score indicates a moderate to severe risk of heart attack and stroke. She has been informed about the potential side effects of statins, including leg and muscle pain, and has been advised to report any  such symptoms immediately. She will start atorvastatin 20 mg daily for a trial period of 30 days. She has been advised to take aspirin 81 mg daily and atorvastatin, which can be taken on an empty stomach.      Follow-up  The patient will follow up in 6 months.    Diagnoses and all orders for this visit:    1. Type 2 diabetes mellitus with hyperglycemia, without long-term current use of insulin (Primary)  -     CBC (No Diff); Future  -     Comprehensive Metabolic Panel; Future  -     Lipid Panel With / Chol / HDL Ratio; Future  -     TSH Rfx On Abnormal To Free T4; Future  -     Hemoglobin A1c; Future    2. Mixed hyperlipidemia  -     aspirin 81 MG EC tablet; Take 1 tablet by mouth Daily.  -     atorvastatin (LIPITOR) 20 MG tablet; Take 1 tablet by mouth Daily.  Dispense: 30 tablet; Refill: 0  -     CBC (No Diff); Future  -     Comprehensive Metabolic Panel; Future  -     Lipid Panel With / Chol / HDL Ratio; Future  -     TSH Rfx On Abnormal To Free T4; Future  -     Hemoglobin A1c; Future          Follow Up:  Return in about 6 months (around 8/28/2025) for Medicare Wellness with fasting labs week before.  .    In the meantime, instructed to contact us sooner for any problems or concerns.    Patient was given instructions and counseling regarding condition or for health maintenance advice.  Please see specific information pulled into the AVS if appropriate.      Patient or patient representative verbalized consent for the use of Ambient Listening during the visit with  ESTHER Yoder for chart documentation. 2/28/2025  17:18 ESTHER Zarate  Family Medicine  [facility]  02/28/25  17:18 EST

## 2025-04-22 DIAGNOSIS — E11.65 TYPE 2 DIABETES MELLITUS WITH HYPERGLYCEMIA, WITHOUT LONG-TERM CURRENT USE OF INSULIN: ICD-10-CM

## (undated) DEVICE — KT ORCA ORCAPOD DISP STRL

## (undated) DEVICE — CANN O2 ETCO2 FITS ALL CONN CO2 SMPL A/ 7IN DISP LF

## (undated) DEVICE — ADAPT CLN SCPE ENDO PORPOISE BX/50 DISP

## (undated) DEVICE — Device

## (undated) DEVICE — GOWN PROC ENDOARMOR GI LVL3 HY/SHLD UNIV

## (undated) DEVICE — FLEX ADVANTAGE 1500CC: Brand: FLEX ADVANTAGE